# Patient Record
Sex: MALE | Race: OTHER | NOT HISPANIC OR LATINO
[De-identification: names, ages, dates, MRNs, and addresses within clinical notes are randomized per-mention and may not be internally consistent; named-entity substitution may affect disease eponyms.]

---

## 2017-12-26 ENCOUNTER — APPOINTMENT (OUTPATIENT)
Dept: ENDOCRINOLOGY | Facility: CLINIC | Age: 42
End: 2017-12-26
Payer: COMMERCIAL

## 2017-12-26 VITALS
DIASTOLIC BLOOD PRESSURE: 93 MMHG | WEIGHT: 197 LBS | HEIGHT: 68 IN | SYSTOLIC BLOOD PRESSURE: 146 MMHG | HEART RATE: 98 BPM | BODY MASS INDEX: 29.86 KG/M2

## 2017-12-26 DIAGNOSIS — E80.4 GILBERT SYNDROME: ICD-10-CM

## 2017-12-26 DIAGNOSIS — E78.1 PURE HYPERGLYCERIDEMIA: ICD-10-CM

## 2017-12-26 DIAGNOSIS — R46.81 OBSESSIVE-COMPULSIVE BEHAVIOR: ICD-10-CM

## 2017-12-26 PROCEDURE — 36415 COLL VENOUS BLD VENIPUNCTURE: CPT

## 2017-12-26 PROCEDURE — 99205 OFFICE O/P NEW HI 60 MIN: CPT | Mod: 25

## 2017-12-29 LAB
24R-OH-CALCIDIOL SERPL-MCNC: 78.7 PG/ML
25(OH)D3 SERPL-MCNC: 23.5 NG/ML
ALBUMIN SERPL ELPH-MCNC: 4.8 G/DL
ALP BLD-CCNC: 39 U/L
ALT SERPL-CCNC: 23 U/L
ANION GAP SERPL CALC-SCNC: 13 MMOL/L
AST SERPL-CCNC: 30 U/L
BILIRUB SERPL-MCNC: 0.6 MG/DL
BUN SERPL-MCNC: 13 MG/DL
CALCIUM ?TM UR-MCNC: 4 MG/DL
CALCIUM SERPL-MCNC: 10.5 MG/DL
CALCIUM SERPL-MCNC: 10.5 MG/DL
CALCIUM/CREAT UR: 0.1 RATIO
CHLORIDE SERPL-SCNC: 97 MMOL/L
CO2 SERPL-SCNC: 22 MMOL/L
CREAT SERPL-MCNC: 1.24 MG/DL
CREAT SPEC-SCNC: 79 MG/DL
GLUCOSE SERPL-MCNC: 82 MG/DL
PARATHYROID HORMONE INTACT: 31 PG/ML
POTASSIUM SERPL-SCNC: 4.2 MMOL/L
PROT SERPL-MCNC: 7.6 G/DL
SODIUM SERPL-SCNC: 132 MMOL/L
TSH SERPL-ACNC: 1.9 UIU/ML

## 2018-01-09 ENCOUNTER — OTHER (OUTPATIENT)
Age: 43
End: 2018-01-09

## 2018-01-17 ENCOUNTER — RESULT CHARGE (OUTPATIENT)
Age: 43
End: 2018-01-17

## 2018-01-17 ENCOUNTER — OTHER (OUTPATIENT)
Age: 43
End: 2018-01-17

## 2018-02-06 ENCOUNTER — RESULT CHARGE (OUTPATIENT)
Age: 43
End: 2018-02-06

## 2018-02-06 ENCOUNTER — OTHER (OUTPATIENT)
Age: 43
End: 2018-02-06

## 2018-06-19 ENCOUNTER — APPOINTMENT (OUTPATIENT)
Dept: ENDOCRINOLOGY | Facility: CLINIC | Age: 43
End: 2018-06-19
Payer: COMMERCIAL

## 2018-06-19 VITALS
HEIGHT: 68 IN | DIASTOLIC BLOOD PRESSURE: 96 MMHG | HEART RATE: 100 BPM | BODY MASS INDEX: 30.01 KG/M2 | SYSTOLIC BLOOD PRESSURE: 142 MMHG | WEIGHT: 198 LBS

## 2018-06-19 PROCEDURE — 99214 OFFICE O/P EST MOD 30 MIN: CPT

## 2018-06-27 ENCOUNTER — FORM ENCOUNTER (OUTPATIENT)
Age: 43
End: 2018-06-27

## 2018-06-28 ENCOUNTER — APPOINTMENT (OUTPATIENT)
Dept: ULTRASOUND IMAGING | Facility: HOSPITAL | Age: 43
End: 2018-06-28
Payer: COMMERCIAL

## 2018-06-28 ENCOUNTER — OUTPATIENT (OUTPATIENT)
Dept: OUTPATIENT SERVICES | Facility: HOSPITAL | Age: 43
LOS: 1 days | End: 2018-06-28
Payer: COMMERCIAL

## 2018-06-28 PROCEDURE — 76536 US EXAM OF HEAD AND NECK: CPT | Mod: 26

## 2018-06-28 PROCEDURE — 76536 US EXAM OF HEAD AND NECK: CPT

## 2018-07-06 LAB
24R-OH-CALCIDIOL SERPL-MCNC: 40.3 PG/ML
25(OH)D3 SERPL-MCNC: 25.9 NG/ML
ALBUMIN SERPL ELPH-MCNC: 5.3 G/DL
ALP BLD-CCNC: 41 U/L
ALT SERPL-CCNC: 31 U/L
ANION GAP SERPL CALC-SCNC: 15 MMOL/L
AST SERPL-CCNC: 31 U/L
BILIRUB SERPL-MCNC: 0.5 MG/DL
BUN SERPL-MCNC: 11 MG/DL
CALCIUM SERPL-MCNC: 10.3 MG/DL
CALCIUM SERPL-MCNC: 10.3 MG/DL
CHLORIDE SERPL-SCNC: 96 MMOL/L
CO2 SERPL-SCNC: 27 MMOL/L
CREAT SERPL-MCNC: 1.19 MG/DL
GLUCOSE SERPL-MCNC: 93 MG/DL
PARATHYROID HORMONE INTACT: 38 PG/ML
POTASSIUM SERPL-SCNC: 4.7 MMOL/L
PROT SERPL-MCNC: 7.6 G/DL
SODIUM SERPL-SCNC: 138 MMOL/L
TSH SERPL-ACNC: 2.92 UIU/ML

## 2018-07-10 ENCOUNTER — TRANSCRIPTION ENCOUNTER (OUTPATIENT)
Age: 43
End: 2018-07-10

## 2018-07-31 ENCOUNTER — APPOINTMENT (OUTPATIENT)
Dept: OTOLARYNGOLOGY | Facility: CLINIC | Age: 43
End: 2018-07-31
Payer: COMMERCIAL

## 2018-07-31 VITALS
HEART RATE: 95 BPM | TEMPERATURE: 98.2 F | DIASTOLIC BLOOD PRESSURE: 93 MMHG | SYSTOLIC BLOOD PRESSURE: 152 MMHG | RESPIRATION RATE: 16 BRPM | OXYGEN SATURATION: 97 % | WEIGHT: 190 LBS | HEIGHT: 69 IN | BODY MASS INDEX: 28.14 KG/M2

## 2018-07-31 DIAGNOSIS — Z83.49 FAMILY HISTORY OF OTHER ENDOCRINE, NUTRITIONAL AND METABOLIC DISEASES: ICD-10-CM

## 2018-07-31 DIAGNOSIS — Z81.8 FAMILY HISTORY OF OTHER MENTAL AND BEHAVIORAL DISORDERS: ICD-10-CM

## 2018-07-31 DIAGNOSIS — Z82.49 FAMILY HISTORY OF ISCHEMIC HEART DISEASE AND OTHER DISEASES OF THE CIRCULATORY SYSTEM: ICD-10-CM

## 2018-07-31 DIAGNOSIS — Z86.79 PERSONAL HISTORY OF OTHER DISEASES OF THE CIRCULATORY SYSTEM: ICD-10-CM

## 2018-07-31 DIAGNOSIS — Z87.19 PERSONAL HISTORY OF OTHER DISEASES OF THE DIGESTIVE SYSTEM: ICD-10-CM

## 2018-07-31 DIAGNOSIS — J35.2 HYPERTROPHY OF ADENOIDS: ICD-10-CM

## 2018-07-31 DIAGNOSIS — Z83.3 FAMILY HISTORY OF DIABETES MELLITUS: ICD-10-CM

## 2018-07-31 PROCEDURE — 31575 DIAGNOSTIC LARYNGOSCOPY: CPT

## 2018-07-31 PROCEDURE — 99204 OFFICE O/P NEW MOD 45 MIN: CPT | Mod: 25

## 2018-07-31 RX ORDER — SIMVASTATIN 40 MG/1
40 TABLET, FILM COATED ORAL
Refills: 0 | Status: DISCONTINUED | COMMUNITY
End: 2018-07-31

## 2018-07-31 RX ORDER — FENOFIBRATE 145 MG/1
145 TABLET, COATED ORAL
Refills: 0 | Status: ACTIVE | COMMUNITY

## 2018-07-31 RX ORDER — QUETIAPINE 25 MG/1
25 TABLET, FILM COATED ORAL
Refills: 0 | Status: ACTIVE | COMMUNITY

## 2018-07-31 RX ORDER — CLONAZEPAM 0.5 MG/1
0.5 TABLET ORAL
Refills: 0 | Status: ACTIVE | COMMUNITY

## 2018-07-31 RX ORDER — CLOMIPRAMINE HYDROCHLORIDE 25 MG/1
25 CAPSULE ORAL
Refills: 0 | Status: ACTIVE | COMMUNITY

## 2018-11-11 ENCOUNTER — MOBILE ON CALL (OUTPATIENT)
Age: 43
End: 2018-11-11

## 2018-11-29 ENCOUNTER — APPOINTMENT (OUTPATIENT)
Dept: OTOLARYNGOLOGY | Facility: CLINIC | Age: 43
End: 2018-11-29
Payer: COMMERCIAL

## 2018-11-29 VITALS
DIASTOLIC BLOOD PRESSURE: 98 MMHG | HEART RATE: 115 BPM | SYSTOLIC BLOOD PRESSURE: 159 MMHG | RESPIRATION RATE: 17 BRPM | OXYGEN SATURATION: 98 % | TEMPERATURE: 98.3 F

## 2018-11-29 DIAGNOSIS — H61.22 IMPACTED CERUMEN, LEFT EAR: ICD-10-CM

## 2018-11-29 PROCEDURE — 31575 DIAGNOSTIC LARYNGOSCOPY: CPT

## 2018-11-29 PROCEDURE — 99214 OFFICE O/P EST MOD 30 MIN: CPT | Mod: 25

## 2018-11-29 PROCEDURE — 76536 US EXAM OF HEAD AND NECK: CPT

## 2018-12-10 LAB
24R-OH-CALCIDIOL SERPL-MCNC: 66.2 PG/ML
25(OH)D3 SERPL-MCNC: 33.5 NG/ML
ALBUMIN SERPL ELPH-MCNC: 5.4 G/DL
ALP BLD-CCNC: 54 U/L
ALT SERPL-CCNC: 33 U/L
ANION GAP SERPL CALC-SCNC: 13 MMOL/L
AST SERPL-CCNC: 27 U/L
BILIRUB SERPL-MCNC: 0.4 MG/DL
BUN SERPL-MCNC: 12 MG/DL
CA-I SERPL-SCNC: 1.33 MMOL/L
CALCIUM SERPL-MCNC: 10.5 MG/DL
CALCIUM SERPL-MCNC: 10.5 MG/DL
CHLORIDE SERPL-SCNC: 101 MMOL/L
CO2 SERPL-SCNC: 25 MMOL/L
CREAT SERPL-MCNC: 1.32 MG/DL
GLUCOSE SERPL-MCNC: 93 MG/DL
PARATHYROID HORMONE INTACT: 33 PG/ML
POTASSIUM SERPL-SCNC: 4.3 MMOL/L
PROT SERPL-MCNC: 7.6 G/DL
SODIUM SERPL-SCNC: 139 MMOL/L

## 2018-12-14 ENCOUNTER — MOBILE ON CALL (OUTPATIENT)
Age: 43
End: 2018-12-14

## 2018-12-14 ENCOUNTER — OTHER (OUTPATIENT)
Age: 43
End: 2018-12-14

## 2018-12-14 ENCOUNTER — RESULT CHARGE (OUTPATIENT)
Age: 43
End: 2018-12-14

## 2018-12-17 LAB
ALBUMIN SERPL ELPH-MCNC: 5.3 G/DL
ALP BLD-CCNC: 44 U/L
ALT SERPL-CCNC: 33 U/L
ANION GAP SERPL CALC-SCNC: 11 MMOL/L
AST SERPL-CCNC: 27 U/L
BILIRUB SERPL-MCNC: 0.5 MG/DL
BUN SERPL-MCNC: 13 MG/DL
CALCIUM SERPL-MCNC: 9.9 MG/DL
CALCIUM SERPL-MCNC: 9.9 MG/DL
CHLORIDE SERPL-SCNC: 101 MMOL/L
CO2 SERPL-SCNC: 24 MMOL/L
CREAT SERPL-MCNC: 1.2 MG/DL
GLUCOSE SERPL-MCNC: 91 MG/DL
PARATHYROID HORMONE INTACT: 33 PG/ML
POTASSIUM SERPL-SCNC: 4.9 MMOL/L
PROT SERPL-MCNC: 7.3 G/DL
SODIUM SERPL-SCNC: 136 MMOL/L

## 2018-12-18 ENCOUNTER — OTHER (OUTPATIENT)
Age: 43
End: 2018-12-18

## 2018-12-19 ENCOUNTER — APPOINTMENT (OUTPATIENT)
Dept: OTOLARYNGOLOGY | Facility: HOSPITAL | Age: 43
End: 2018-12-19

## 2018-12-19 ENCOUNTER — OTHER (OUTPATIENT)
Age: 43
End: 2018-12-19

## 2018-12-20 ENCOUNTER — RX CHANGE (OUTPATIENT)
Age: 43
End: 2018-12-20

## 2019-01-15 ENCOUNTER — OTHER (OUTPATIENT)
Age: 44
End: 2019-01-15

## 2019-01-25 ENCOUNTER — MOBILE ON CALL (OUTPATIENT)
Age: 44
End: 2019-01-25

## 2019-01-28 ENCOUNTER — APPOINTMENT (OUTPATIENT)
Dept: ENDOCRINOLOGY | Facility: CLINIC | Age: 44
End: 2019-01-28

## 2019-02-01 ENCOUNTER — APPOINTMENT (OUTPATIENT)
Dept: OTOLARYNGOLOGY | Facility: HOSPITAL | Age: 44
End: 2019-02-01

## 2019-02-08 PROBLEM — N18.9 CHRONIC KIDNEY DISEASE, UNSPECIFIED: Chronic | Status: ACTIVE | Noted: 2018-12-18

## 2019-02-08 PROBLEM — E21.3 HYPERPARATHYROIDISM, UNSPECIFIED: Chronic | Status: ACTIVE | Noted: 2018-12-18

## 2019-02-08 PROBLEM — I34.1 NONRHEUMATIC MITRAL (VALVE) PROLAPSE: Chronic | Status: ACTIVE | Noted: 2018-12-18

## 2019-02-08 PROBLEM — E78.5 HYPERLIPIDEMIA, UNSPECIFIED: Chronic | Status: ACTIVE | Noted: 2018-12-18

## 2019-03-04 ENCOUNTER — TRANSCRIPTION ENCOUNTER (OUTPATIENT)
Age: 44
End: 2019-03-04

## 2019-05-09 ENCOUNTER — APPOINTMENT (OUTPATIENT)
Dept: OTOLARYNGOLOGY | Facility: CLINIC | Age: 44
End: 2019-05-09

## 2019-07-29 ENCOUNTER — FORM ENCOUNTER (OUTPATIENT)
Age: 44
End: 2019-07-29

## 2019-07-30 ENCOUNTER — OUTPATIENT (OUTPATIENT)
Dept: OUTPATIENT SERVICES | Facility: HOSPITAL | Age: 44
LOS: 1 days | End: 2019-07-30
Payer: COMMERCIAL

## 2019-07-30 DIAGNOSIS — Z90.49 ACQUIRED ABSENCE OF OTHER SPECIFIED PARTS OF DIGESTIVE TRACT: Chronic | ICD-10-CM

## 2019-07-30 DIAGNOSIS — Z90.89 ACQUIRED ABSENCE OF OTHER ORGANS: Chronic | ICD-10-CM

## 2019-07-30 PROCEDURE — A9500: CPT

## 2019-07-30 PROCEDURE — 78072 PARATHYRD PLANAR W/SPECT&CT: CPT

## 2019-07-30 PROCEDURE — A9512: CPT

## 2019-07-30 PROCEDURE — 78072 PARATHYRD PLANAR W/SPECT&CT: CPT | Mod: 26

## 2019-08-27 ENCOUNTER — APPOINTMENT (OUTPATIENT)
Dept: OTOLARYNGOLOGY | Facility: CLINIC | Age: 44
End: 2019-08-27
Payer: COMMERCIAL

## 2019-08-27 VITALS
OXYGEN SATURATION: 96 % | SYSTOLIC BLOOD PRESSURE: 168 MMHG | TEMPERATURE: 98.3 F | DIASTOLIC BLOOD PRESSURE: 114 MMHG | HEART RATE: 100 BPM

## 2019-08-27 PROCEDURE — 99214 OFFICE O/P EST MOD 30 MIN: CPT | Mod: 25

## 2019-08-27 PROCEDURE — 76536 US EXAM OF HEAD AND NECK: CPT

## 2019-08-27 NOTE — PROCEDURE
[FreeTextEntry3] : \par [facility]\par THYROID/NECK ULTRASOUND REPORT\par \par NAME: DESIRAE CATHERINE            MR# [id number] 	              : 71	         DATE: 19\par \par HISTORY/ INDICATIONS: A 44-year-old male with primary hyperparathyroidism rule out a parathyroid adenoma or thyroid disease.  \par \par COMPARISON: Prior study 18.\par \par PROCEDURE: Physician performed high-resolution ultrasound gray scale imaging and color Doppler supplementation of the thyroid gland and neck was obtained in the longitudinal and transverse planes using a 13 MHz linear transducer with image capture.  All measurements are in centimeters (longitudinal x AP x transverse).  \par \par FINDINGS: Overall the thyroid gland is normal in size, hypoechoic and  heterogeneous in echotexture without discrete nodules. Vascularity is minimal on color Doppler flow.  Size measurement deferred as this is a parathyroid study.  An ovoid, hypoechoic structure just inferior to the left thyroid lobe with a feeding vessel is consistent with a parathyroid adenoma.\par \par PARATHYROID GLANDS: An ovoid smoothly marginated structure adjacent to the common carotid artery and  from the left lower thyroid lobe parenchyma by an echogenic line measures 0.98 x 0.57 x 1.02 cm with an identified feeding vessel on Doppler study. There are no other identified enlarged parathyroid glands in the central neck compartment. \par \par LYMPH NODES: There are several benign appearing subcentimeter lymph nodes identified at neck levels III bilaterally, all with echogenic hilar lines and a short long axis ratio < 0.5 in the transverse plane.\par \par IMPRESSION: A 44-year-old male with borderline biochemical evidence of primary hyperparathyroidism and imaging evidence supporting a left inferior positioned parathyroid adenoma. The thyroid gland echo texture is suggestive of autoimmune thyroiditis. There were no enlarged or morphologically abnormal appearing cervical lymph nodes identified. \par \par RECOMMENDATIONS: A minimally invasive parathyroidectomy should be considered if after further monitoring his serum calcium rises and his iPTH remains minimally or non- suppressed above 25 pg/ml.  \par \par Electronically signed by Viktor Engle MD on 19,  2:23 PM [de-identified] : \par

## 2019-08-27 NOTE — REASON FOR VISIT
[FreeTextEntry1] : aMrquise Alcantar MD PCP, Anders Dela Cruz MD Cardiologist,  Dorian Wills MD  Endocrinologist [FreeTextEntry2] : surgical consultation for primary hyperparathyroidism and follow up after 4-D neck CT scan for parathyroid localization and repeat labs.

## 2019-08-27 NOTE — CONSULT LETTER
[Dear  ___] : Dear  [unfilled], [Consult Letter:] : I had the pleasure of evaluating your patient, [unfilled]. [Please see my note below.] : Please see my note below. [Consult Closing:] : Thank you very much for allowing me to participate in the care of this patient.  If you have any questions, please do not hesitate to contact me. [DrMalia  ___] : Dr. ESTRELLA [Sincerely,] : Sincerely, [FreeTextEntry3] : \par Viktor Engle M.D., FACS, ECNU\par Director Center for Thyroid & Parathyroid Surgery\par [facility]\par Certified in Thyroid/Parathyroid/Neck Ultrasound, ECNU/ AIUM\par \par , Department of Otolaryngology\par [facility] [employer] at [facility]\par

## 2019-08-27 NOTE — HISTORY OF PRESENT ILLNESS
[de-identified] : Yifan is a generally healthy 43-year-old male  in the [employer] industry who was first noted 4-5 years ago to have mild hypercalcemia and had been monitored for this.  His calcium level most recently has been in the high normal range with a normal but nonsuppressed iPTH and a low vitamin D 25-OH total (25.9).  He has normal renal function.  He has no history of nephrolithiasis. He is euthyroid and a recent thyroid ultrasound revealed bilateral subcentimeter colloid cysts and two potential candidates for enlarged parathyroid glands that were described as extra-thyroidal adjacent to the posterior/inferior thyroid lobes measuring 9 mm (RT) and 10 mm (LT).  There were no abnormal cervical lymph nodes identified.  Both of his parents have or had hypothyroidism.  There is no family history of thyroid cancer or hypercalcemia.  He has no known radiation exposures. He denies bone fractures, joint or bone pain, memory loss, depression, fatigue, muscle weakness, abdominal pain, nausea, constipation, but does have polyuria and polydipsia. Yifan denies recent shortness of breath, voice changes, dysphagia, anterior neck pain, neck pressure or mass. There is no family history of thyroid cancer. He denies any known radiation exposures in his youth. \par   [FreeTextEntry1] : Yifan returns to review recent blood results, 4-D neck CT and ultrasound reports to consider a MIP. There had been a question in the past of primary versus secondary hyperparathyroidism.  In 2016 he had both elevation of his iPTH as well as mild hypercalcemia (61.7 pg/ml/ 10.3 mg/dl) and hypercalciuria, confirming the diagnosis of primary hyperparathyroidism. His calcium and iPTH levels have fluctuated since then but he has always had a non-suppressed PTH in association with an elevated serum calcium level.  His last calcium was higher at 10.5 mg/dl with normal renal function and normal Vitamin D 25-OH total.  A 4-D CT scan of the neck confirmed a left inferior parathyroid adenoma with typical contrast enhancement and washout characteristics measuring 10 mm.  Although he appears to be asymptomatic, based on his age under 50 years, he meets NIH consensus criteria for a MIP.  There were no significant thyroid nodules.  He had repeat labs sent in December of last year and both calcium and iPTH had normalized so surgery was postponed. His last set of labs in July revealed a Ca/PTH of 10.1/41.6 pg/ml.  His vitamin D 25-OH total has now come up to 44.2. Other than chronic fatigue he is not very symptomatic.  He is to have a polysomnogram shortly and if he does not have TYLER then his fatigue could be related to primary hyperparathyroidism.

## 2019-10-01 ENCOUNTER — APPOINTMENT (OUTPATIENT)
Dept: ENDOCRINOLOGY | Facility: CLINIC | Age: 44
End: 2019-10-01
Payer: COMMERCIAL

## 2019-10-01 VITALS
BODY MASS INDEX: 29.77 KG/M2 | HEIGHT: 69 IN | WEIGHT: 201 LBS | DIASTOLIC BLOOD PRESSURE: 101 MMHG | SYSTOLIC BLOOD PRESSURE: 139 MMHG | HEART RATE: 86 BPM

## 2019-10-01 PROCEDURE — 99214 OFFICE O/P EST MOD 30 MIN: CPT

## 2019-10-01 NOTE — PHYSICAL EXAM
[Alert] : alert [Normal Sclera/Conjunctiva] : normal sclera/conjunctiva [Thyroid Not Enlarged] : the thyroid was not enlarged [Normal Outer Ear/Nose] : the ears and nose were normal in appearance [No Respiratory Distress] : no respiratory distress [Clear to Auscultation] : lungs were clear to auscultation bilaterally [Normal S1, S2] : normal S1 and S2 [Normal Rate] : heart rate was normal  [Pedal Pulses Normal] : the pedal pulses are present [Normal Bowel Sounds] : normal bowel sounds [Spine Straight] : spine straight [Normal Gait] : normal gait [No Rash] : no rash [Normal Reflexes] : deep tendon reflexes were 2+ and symmetric [Oriented x3] : oriented to person, place, and time

## 2019-10-03 NOTE — ASSESSMENT
[FreeTextEntry1] : 43 y/o M pt with:\par 1. Hx of primary hyperparathyroidism, with high normal levels of Ca and iPTH, and normal Vit D levels:\par An US confirmed a L inferior 1cm parathyroid adenoma, that concords with imaging studies. \par Pt has history of OCD, HTN, and obesity. He was recently seen by ENT, and is awaiting for sleep apnea disorder studies. \par Pt will return in 12/2019 for f/u. At present time, currently leaning towards surgical excision of parathyroid adenoma. \par \par Return in 1/2020.

## 2019-10-03 NOTE — END OF VISIT
[FreeTextEntry3] : All medical record entries made by the Scribe were at my, Dr. Dorian Wills, direction and personally dictated by me on 10/01/2019. I have reviewed the chart and agree that the record accurately reflects my personal performance of the history, physical exam, assessment and plan. I have also personally directed, reviewed and agreed with the chart.  [>50% of Time Spent on Counseling for ____] : Greater than 50% of the encounter time was spent on counseling for [unfilled] [Time Spent: ___ minutes] : I have spent [unfilled] minutes of face to face time with the patient

## 2019-10-03 NOTE — HISTORY OF PRESENT ILLNESS
[FreeTextEntry1] : 4/18/17 calcium 10.8, intact PTH 46, a 25 oh vit d 32.9\par 8/7/17, calcium 10.6, s. cret 1.4, i PTH 57.7 (15-65), a 25 oh vit d : 36.3,tsh 1.7\par 12/26/17, Ca 10.5, i PTH 31, TSH 1.9\par 1/2/18 Thyroid ultrasound, heterogeneous gland. Extrathyroidal nodule 0.8 x 0.6 x 0.1 cm posterior to the left thyroid lobe Consistent with parathyroid nodule/adenoma\par 7/30/19 Radionuclide parathyroid scan and thyroid scan: with approximately 20 mCi of technetium 99m labeled sestamibi. Impression: No uptake within a soft tissue nodule posterior to the left thyroid lobe which may correspond to one of the abnormality seen on referenced ultrasound. There is posterior extension of the right thyroid lobe which demonstrates similar uptake to the remainder of the thyroid gland. There is no definite parathyroid adenoma visualized.\par 8/19/19 iPTH 41.6, TSH 2.54, Vit D 25 OH 44.2, Ca 10.1, s. creat 1.3, cholesterol 185, LDL-c 115.4, , A1c 5.8% \par \par 43 y/o M pt with Hx of mild hypercalcemia (dx at [facility]), hyperparathyroidism, and parathyroid adenoma.\par Other PMHx: VIt D deficiency, reflux laryngitis, hypercholesterolemia, OCD\par No PMHx of kidney stones, bone fractures, weight loss, malabsorption, bone pain, endocrinopathies\par No FHx of calcium disorders including FHH.\par \par 10/1/19\par Today pt presents for endocrine f/u. He notes his BP has been elevated and "may be on medications down the road." Pt states he did a sleep test and will bring in the results; he notes he will have parathyroid and ca levels in 12/2019 and then will decide on parathyroid surgery.\par Overall, pt gained 3lbs since 6/2018.\par \par Current Medicines: Klonopin, Seroquel, Zocor, fenofibrate, clomipramine, Lipitor, Vit D 2000

## 2019-12-12 ENCOUNTER — APPOINTMENT (OUTPATIENT)
Dept: OTOLARYNGOLOGY | Facility: CLINIC | Age: 44
End: 2019-12-12
Payer: COMMERCIAL

## 2019-12-12 VITALS
TEMPERATURE: 98.1 F | OXYGEN SATURATION: 98 % | RESPIRATION RATE: 15 BRPM | HEART RATE: 91 BPM | SYSTOLIC BLOOD PRESSURE: 117 MMHG | DIASTOLIC BLOOD PRESSURE: 76 MMHG

## 2019-12-12 PROCEDURE — 31575 DIAGNOSTIC LARYNGOSCOPY: CPT

## 2019-12-12 PROCEDURE — 99214 OFFICE O/P EST MOD 30 MIN: CPT | Mod: 25

## 2019-12-12 NOTE — HISTORY OF PRESENT ILLNESS
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  His calcium level most recently has been in the high normal range with a normal but nonsuppressed iPTH and a low vitamin D 25-OH total (25.9).  He has normal renal function.  He has no history of nephrolithiasis. He is euthyroid and a recent thyroid ultrasound revealed bilateral subcentimeter colloid cysts and two potential candidates for enlarged parathyroid glands that were described as extra-thyroidal adjacent to the posterior/inferior thyroid lobes measuring 9 mm (RT) and 10 mm (LT).  There were no abnormal cervical lymph nodes identified.  Both of his parents have or had hypothyroidism.  There is no family history of thyroid cancer or hypercalcemia.  He has no known radiation exposures. He denies bone fractures, joint or bone pain, memory loss, depression, fatigue, muscle weakness, abdominal pain, nausea, constipation, but does have polyuria and polydipsia. Yifan denies recent shortness of breath, voice changes, dysphagia, anterior neck pain, neck pressure or mass. There is no family history of thyroid cancer. He denies any known radiation exposures in his youth. \par   [FreeTextEntry1] : Yifan returns to review recent blood results, 4-D neck CT and ultrasound reports to consider a MIP. There had been a question in the past of primary versus secondary hyperparathyroidism.  In 2016 he had both elevation of his iPTH as well as mild hypercalcemia (61.7 pg/ml/ 10.3 mg/dl) and hypercalciuria, initially confirming the diagnosis of primary hyperparathyroidism. His calcium and iPTH levels have fluctuated since then but he has always had a non-suppressed PTH in association with an elevated serum calcium level. However, his last PTH was low at 17 pg/ml.  A 4-D CT scan of the neck confirmed a left inferior parathyroid adenoma with typical contrast enhancement and washout characteristics measuring 10 mm.  Although he appears to be asymptomatic, based on his age under 50 years, he meets NIH consensus criteria for a MIP.  There were no significant thyroid nodules.  He had repeat labs sent in December of last year and both calcium and iPTH had normalized so surgery was postponed. His last set of labs in July revealed a Ca/PTH of 10.1/41.6 pg/ml.   His last calcium was higher at 10.8 mg/dl with normal renal function and normal Vitamin D 25-OH total.  Other than chronic fatigue he is not very symptomatic.  He had a polysomnogram and moderate TYLER on a home study. His his fatigue could still be related to primary hyperparathyroidism.

## 2019-12-12 NOTE — DATA REVIEWED
[de-identified] : see HPI [de-identified] : see HPI [de-identified] : see HPI [de-identified] : see HPI [de-identified] : Sestamibi scan reviewed

## 2019-12-12 NOTE — CONSULT LETTER
[Dear  ___] : Dear  [unfilled], [Consult Letter:] : I had the pleasure of evaluating your patient, [unfilled]. [Please see my note below.] : Please see my note below. [Consult Closing:] : Thank you very much for allowing me to participate in the care of this patient.  If you have any questions, please do not hesitate to contact me. [Sincerely,] : Sincerely, [FreeTextEntry3] : \par Viktor Engle M.D., FACS, ECNU\par Director Center for Thyroid & Parathyroid Surgery\par [facility]\par Certified in Thyroid/Parathyroid/Neck Ultrasound, ECNU/ AIUM\par \par , Department of Otolaryngology\par [facility] [employer] at [facility]\par

## 2019-12-12 NOTE — REASON FOR VISIT
[FreeTextEntry2] : surgical consultation for primary hyperparathyroidism and follow up after 4-D neck CT scan and Sestamibi for parathyroid localization and repeat labs. [FreeTextEntry1] : Marquise Alcantar MD PCP, Anders Dela Cruz MD Cardiologist,  Dorian Wills MD  Endocrinologist

## 2019-12-14 LAB
ALBUMIN MFR SERPL ELPH: 67.6 %
ALBUMIN SERPL ELPH-MCNC: 5.4 G/DL
ALBUMIN SERPL-MCNC: 5.3 G/DL
ALBUMIN/GLOB SERPL: 2 RATIO
ALP BLD-CCNC: 39 U/L
ALPHA1 GLOB MFR SERPL ELPH: 2.9 %
ALPHA1 GLOB SERPL ELPH-MCNC: 0.2 G/DL
ALPHA2 GLOB MFR SERPL ELPH: 7.2 %
ALPHA2 GLOB SERPL ELPH-MCNC: 0.6 G/DL
ALT SERPL-CCNC: 26 U/L
ANION GAP SERPL CALC-SCNC: 13 MMOL/L
AST SERPL-CCNC: 25 U/L
B-GLOBULIN MFR SERPL ELPH: 11.9 %
B-GLOBULIN SERPL ELPH-MCNC: 0.9 G/DL
BILIRUB SERPL-MCNC: 0.6 MG/DL
BUN SERPL-MCNC: 16 MG/DL
CA-I SERPL-SCNC: 1.33 MMOL/L
CALCIUM SERPL-MCNC: 10.9 MG/DL
CALCIUM SERPL-MCNC: 10.9 MG/DL
CHLORIDE SERPL-SCNC: 97 MMOL/L
CO2 SERPL-SCNC: 27 MMOL/L
CREAT SERPL-MCNC: 1.31 MG/DL
GAMMA GLOB FLD ELPH-MCNC: 0.8 G/DL
GAMMA GLOB MFR SERPL ELPH: 10.4 %
GLUCOSE SERPL-MCNC: 91 MG/DL
INTERPRETATION SERPL IEP-IMP: NORMAL
PARATHYROID HORMONE INTACT: 24 PG/ML
POTASSIUM SERPL-SCNC: 4.5 MMOL/L
PROT SERPL-MCNC: 7.9 G/DL
SODIUM SERPL-SCNC: 137 MMOL/L

## 2019-12-17 LAB
ACE BLD-CCNC: 45 U/L
PTH RELATED PROT SERPL-MCNC: <2 PMOL/L

## 2019-12-23 LAB
MISCELLANEOUS TEST: NORMAL
PROC NAME: NORMAL

## 2020-02-25 ENCOUNTER — APPOINTMENT (OUTPATIENT)
Dept: ENDOCRINOLOGY | Facility: CLINIC | Age: 45
End: 2020-02-25
Payer: COMMERCIAL

## 2020-02-25 VITALS
BODY MASS INDEX: 30.86 KG/M2 | SYSTOLIC BLOOD PRESSURE: 100 MMHG | HEART RATE: 90 BPM | DIASTOLIC BLOOD PRESSURE: 66 MMHG | WEIGHT: 209 LBS

## 2020-02-25 PROCEDURE — 99214 OFFICE O/P EST MOD 30 MIN: CPT

## 2020-02-25 NOTE — PHYSICAL EXAM
[Alert] : alert [Normal Sclera/Conjunctiva] : normal sclera/conjunctiva [Thyroid Not Enlarged] : the thyroid was not enlarged [No Respiratory Distress] : no respiratory distress [Normal Outer Ear/Nose] : the ears and nose were normal in appearance [Clear to Auscultation] : lungs were clear to auscultation bilaterally [Normal Rate] : heart rate was normal  [Pedal Pulses Normal] : the pedal pulses are present [Normal S1, S2] : normal S1 and S2 [Normal Gait] : normal gait [Spine Straight] : spine straight [Normal Bowel Sounds] : normal bowel sounds [No Rash] : no rash [Normal Reflexes] : deep tendon reflexes were 2+ and symmetric [Oriented x3] : oriented to person, place, and time

## 2020-02-26 NOTE — END OF VISIT
[>50% of Time Spent on Counseling for ____] : Greater than 50% of the encounter time was spent on counseling for [unfilled] [FreeTextEntry3] : All medical record entries made by the Scribe were at my, Dr. Dorian Wills, direction and personally dictated by me on 02/25/2020. I have reviewed the chart and agree that the record accurately reflects my personal performance of the history, physical exam, assessment and plan. I have also personally directed, reviewed and agreed with the chart.  [Time Spent: ___ minutes] : I have spent [unfilled] minutes of face to face time with the patient

## 2020-02-26 NOTE — REVIEW OF SYSTEMS
[As Noted in HPI] : as noted in HPI [Recent Weight Gain (___ Lbs)] : recent [unfilled] ~Ulb weight gain [Negative] : Endocrine [Abdominal Pain] : no abdominal pain

## 2020-02-26 NOTE — HISTORY OF PRESENT ILLNESS
[FreeTextEntry1] : 4/18/17 calcium 10.8, intact PTH 46, a 25 oh vit d 32.9\par 8/7/17, calcium 10.6, s. cret 1.4, i PTH 57.7 (15-65), a 25 oh vit d : 36.3,tsh 1.7\par 12/26/17, Ca 10.5, i PTH 31, TSH 1.9\par 1/2/18 Thyroid US: heterogeneous gland. Extrathyroidal nodule 0.8 x 0.6 x 0.1 cm posterior to the left thyroid lobe Consistent with parathyroid nodule/adenoma\par 7/30/19 Radionuclide parathyroid scan and thyroid scan: with approximately 20 mCi of technetium 99m labeled sestamibi. Impression: No uptake within a soft tissue nodule posterior to the left thyroid lobe which may correspond to one of the abnormality seen on referenced ultrasound. There is posterior extension of the right thyroid lobe which demonstrates similar uptake to the remainder of the thyroid gland. There is no definite parathyroid adenoma visualized.\par 8/19/19 iPTH 41.6, TSH 2.54, Vit D 25 OH 44.2, Ca 10.1, s. creat 1.3, cholesterol 185, LDL-c 115.4, , A1c 5.8% \par 11/26/19 Ca 10.8, iPTH 17, Vit D 25-OH 32, Vit D 1-25- OH 78, \par 12/12/19 Ca ionized 1.33, Ca 10.9, iPTH 24, s. creat 1.31\par \par 43 y/o M pt with Hx of mild hypercalcemia (dx at [facility]), hyperparathyroidism, and parathyroid adenoma.\par Other PMHx: VIt D deficiency, reflux laryngitis, hypercholesterolemia, OCD, sleep apnea (on C-PAP, as per pt)\par No PMHx of kidney stones, bone fractures, weight loss, malabsorption, bone pain, endocrinopathies\par No FHx of calcium disorders including FHH.\par \par 10/1/19\par Today pt presents for endocrine f/u. He notes his BP has been elevated and "may be on medications down the road." Pt states he did a sleep test and will bring in the results; he notes he will have parathyroid and ca levels in 12/2019 and then will decide on parathyroid surgery.\par Overall, pt gained 3lbs since 6/2018.\par \par 2/25/2020\par Today pt presents with his mother for endocrine f/u, feeling well. Pt notes he was recently diagnosed with sleep apnea and is using C-PAP. He states is taking Olmesartan, Fenofibrate, and Lipitor.  \par Pt gained 8lbs since 10/2019.\par Denies abdominal discomfort\par \par Current Medicines: Klonopin, Seroquel, Zocor, Fenofibrate, Olmesartan, clomipramine, Lipitor, Vit D 2000

## 2020-02-26 NOTE — ASSESSMENT
[FreeTextEntry1] : 45 y/o M pt with:\par 1. Hx of primary hyperparathyroidism, with fluctuating levels of  Ca from normal to mild elevation\par Pt does not hx of MEN nor FHx of hypercalcemia. Pt has remained asymptomatic, however, he has unspecific symptoms of fatigue and tiredness. A neck CAT scan done in 11/2018 shows an impression of L inferior parathyroid adenoma which is ~220mg and it lies along the posterior margin of the lower pole. In addition, recent labs show Ca 10.9 with iPTH 24 from 12/12/19. Pt's levels of iPTH and Ca has been fluctuating, and he can continue with present approach as long he  is committed to long years of monitoring for hypercalcemia and probable complications. Otherwise surgery is a suitable option. \par Continue with Vit D and Ca supplementations.\par \par Return in 1-2 yrs.

## 2020-02-26 NOTE — ADDENDUM
[FreeTextEntry1] : I, Stevenson Sánchez, acted solely as a scribe for Dr. Dorian Wills on this date. 02/25/2020.

## 2020-03-05 ENCOUNTER — APPOINTMENT (OUTPATIENT)
Dept: OTOLARYNGOLOGY | Facility: CLINIC | Age: 45
End: 2020-03-05
Payer: COMMERCIAL

## 2020-03-05 VITALS
OXYGEN SATURATION: 99 % | SYSTOLIC BLOOD PRESSURE: 111 MMHG | RESPIRATION RATE: 17 BRPM | HEART RATE: 86 BPM | DIASTOLIC BLOOD PRESSURE: 72 MMHG | TEMPERATURE: 97.6 F

## 2020-03-05 PROCEDURE — 99214 OFFICE O/P EST MOD 30 MIN: CPT | Mod: 25

## 2020-03-05 PROCEDURE — 31575 DIAGNOSTIC LARYNGOSCOPY: CPT

## 2020-03-11 LAB
24R-OH-CALCIDIOL SERPL-MCNC: 80.7 PG/ML
25(OH)D3 SERPL-MCNC: 37.7 NG/ML
ACE BLD-CCNC: 45 U/L
ALBUMIN SERPL ELPH-MCNC: 5.5 G/DL
ALP BLD-CCNC: 37 U/L
ALT SERPL-CCNC: 28 U/L
ANION GAP SERPL CALC-SCNC: 14 MMOL/L
AST SERPL-CCNC: 26 U/L
BILIRUB SERPL-MCNC: 0.5 MG/DL
BUN SERPL-MCNC: 12 MG/DL
CA-I SERPL-SCNC: 1.34 MMOL/L
CALCIUM SERPL-MCNC: 11 MG/DL
CALCIUM SERPL-MCNC: 11 MG/DL
CHLORIDE SERPL-SCNC: 100 MMOL/L
CO2 SERPL-SCNC: 25 MMOL/L
CREAT SERPL-MCNC: 1.35 MG/DL
GLUCOSE SERPL-MCNC: 89 MG/DL
PARATHYROID HORMONE INTACT: 22 PG/ML
POTASSIUM SERPL-SCNC: 5 MMOL/L
PROT SERPL-MCNC: 7.7 G/DL
SODIUM SERPL-SCNC: 138 MMOL/L
THYROGLOB AB SERPL-ACNC: 23.5 IU/ML
THYROPEROXIDASE AB SERPL IA-ACNC: <10 IU/ML
TSH SERPL-ACNC: 2.8 UIU/ML

## 2020-07-07 NOTE — PROCEDURE
[Image(s) Captured] : image(s) captured and filed [Unable to Cooperate with Mirror] : patient unable to cooperate with mirror [Gag Reflex] : gag reflex preventing mirror examination [Topical Lidocaine] : topical lidocaine [Flexible Endoscope] : examined with the flexible endoscope [Oxymetazoline HCl] : oxymetazoline HCl [Serial Number: ___] : Serial Number: [unfilled] [de-identified] : sleep apnea now on CPAP, c/o increasing eructations and GERD.  Voice intermittently hoarse [de-identified] : The nasal septum is minimally deviated to the right with dry crusting bilaterally.  There are no masses or polyps and the nasal mucosa is normal. The nasal secretions are thick but not purulent.  The choanae and posterior nasopharynx are normal without masses or drainage. The Eustachian tube orifices appear patent. The pharynx, including the posterior and lateral pharyngeal walls, the vallecula and base of tongue are normal without ulcerations, lesions or masses. The hypopharynx including the pyriform sinuses open well without pooling of secretions, mucosal lesions or masses. The supraglottic larynx including the epiglottis, petiole, glossoepiglottic folds and pharyngoepiglottic folds are normal without mucosal lesions, ulcerations or masses. The glottis reveals normal false vocal folds. The true vocal folds are glistening white, tense and of equal length, without paralysis, having symmetric mobility on adduction and abduction. There are no mucosal lesions, nodules, cysts, erythroplasia or leukoplakia. The posterior cricoid area has healthy pink mucosa in the interarytenoid area and esophageal inlet. There is moderate thickening/edema of the interarytenoid mucosa, tiger stripping and pachydermia suggestive of posterior laryngitis from laryngopharyngeal acid reflux disease. The trachea is clear without narrowing in the immediate subglottic region, without deviation or lesions. \par

## 2020-07-07 NOTE — REASON FOR VISIT
[FreeTextEntry1] : Marquise Alcantar MD PCP, Anders Dela Cruz MD Cardiologist,  Dorian Wills MD  Endocrinologist [FreeTextEntry2] : surgical consultation for primary hyperparathyroidism and follow up after 4-D neck CT scan and Sestamibi for parathyroid localization and repeat labs.

## 2020-07-07 NOTE — CONSULT LETTER
[Dear  ___] : Dear  [unfilled], [Consult Letter:] : I had the pleasure of evaluating your patient, [unfilled]. [Please see my note below.] : Please see my note below. [Consult Closing:] : Thank you very much for allowing me to participate in the care of this patient.  If you have any questions, please do not hesitate to contact me. [Sincerely,] : Sincerely, [FreeTextEntry3] : \par Viktor Engle M.D., FACS, ECNU\par Director Center for Thyroid & Parathyroid Surgery\par [facility]\par Certified in Thyroid/Parathyroid/Neck Ultrasound, ECNU/ AIUM\par \par , Department of Otolaryngology\par [facility] at [facility]\par

## 2020-07-07 NOTE — DATA REVIEWED
[de-identified] : see HPI [de-identified] : see HPI [de-identified] : see HPI [de-identified] : see HPI [de-identified] : Sestamibi scan reviewed

## 2020-07-07 NOTE — HISTORY OF PRESENT ILLNESS
[FreeTextEntry1] : Yifan returns to review recent blood results, 4-D neck CT and ultrasound reports to consider a MIP. There had been a question in the past of primary versus secondary hyperparathyroidism.  In 2016 he had both elevation of his iPTH as well as mild hypercalcemia (61.7 pg/ml/ 10.3 mg/dl) and hypercalciuria, initially confirming the diagnosis of primary hyperparathyroidism. His calcium and iPTH levels have fluctuated since then but he has always had a non-suppressed PTH in association with an elevated serum calcium level. However, his last PTH was low at 17 pg/ml.  A 4-D CT scan of the neck confirmed a left inferior parathyroid adenoma with typical contrast enhancement and washout characteristics measuring 10 mm.  Although he appears to be asymptomatic, based on his age under 50 years, he meets NIH consensus criteria for a MIP.  There were no significant thyroid nodules.  He had repeat labs sent in December of last year and both calcium and iPTH had normalized so surgery was postponed. His last set of labs in July revealed a Ca/PTH of 10.1/41.6 pg/ml.   His last calcium was higher at 10.8 mg/dl with normal renal function and normal Vitamin D 25-OH total.  Other than chronic fatigue he is not very symptomatic.  He had a polysomnogram and moderate TYLER on a home study. His fatigue is improved on CPAP. He has a humidifier in the machine but still has dry nasal crusts. He has chronic GERD and more eructations with the CPAP.  Voice is intermittently hoarse.  He denies PND, cough, fever, chills or sweats.  He may have autoimmune thyroiditis based on neck CT findings. His most recent labs on 01/06/2020: calcium 10.1 mg/dl, creatinine 1.2, GFR > 60ml/min, no Vit D or PTH sent. [de-identified] : Yifan is a generally healthy 43-year-old male  in the [employer] industry who was first noted 4-5 years ago to have mild hypercalcemia and had been monitored for this.  His calcium level most recently has been in the high normal range with a normal but nonsuppressed iPTH and a low vitamin D 25-OH total (25.9).  He has normal renal function.  He has no history of nephrolithiasis. He is euthyroid and a recent thyroid ultrasound revealed bilateral subcentimeter colloid cysts and two potential candidates for enlarged parathyroid glands that were described as extra-thyroidal adjacent to the posterior/inferior thyroid lobes measuring 9 mm (RT) and 10 mm (LT).  There were no abnormal cervical lymph nodes identified.  Both of his parents have or had hypothyroidism.  There is no family history of thyroid cancer or hypercalcemia.  He has no known radiation exposures. He denies bone fractures, joint or bone pain, memory loss, depression, fatigue, muscle weakness, abdominal pain, nausea, constipation, but does have polyuria and polydipsia. Yifan denies recent shortness of breath, voice changes, dysphagia, anterior neck pain, neck pressure or mass. There is no family history of thyroid cancer. He denies any known radiation exposures in his youth. \par

## 2020-08-24 ENCOUNTER — APPOINTMENT (OUTPATIENT)
Dept: OTOLARYNGOLOGY | Facility: CLINIC | Age: 45
End: 2020-08-24
Payer: COMMERCIAL

## 2020-08-24 VITALS
WEIGHT: 202 LBS | OXYGEN SATURATION: 98 % | HEART RATE: 105 BPM | TEMPERATURE: 98.2 F | BODY MASS INDEX: 29.83 KG/M2 | DIASTOLIC BLOOD PRESSURE: 75 MMHG | SYSTOLIC BLOOD PRESSURE: 117 MMHG

## 2020-08-24 PROCEDURE — 31575 DIAGNOSTIC LARYNGOSCOPY: CPT

## 2020-08-24 PROCEDURE — 76536 US EXAM OF HEAD AND NECK: CPT

## 2020-08-24 PROCEDURE — 99214 OFFICE O/P EST MOD 30 MIN: CPT | Mod: 25

## 2020-08-24 RX ORDER — CHLORHEXIDINE GLUCONATE, 0.12% ORAL RINSE 1.2 MG/ML
0.12 SOLUTION DENTAL
Qty: 473 | Refills: 0 | Status: ACTIVE | COMMUNITY
Start: 2020-01-20

## 2020-08-24 RX ORDER — TOBRAMYCIN AND DEXAMETHASONE 3; 1 MG/ML; MG/ML
0.3-0.1 SUSPENSION/ DROPS OPHTHALMIC
Qty: 5 | Refills: 0 | Status: ACTIVE | COMMUNITY
Start: 2020-05-26

## 2020-08-24 NOTE — PROCEDURE
[Image(s) Captured] : image(s) captured and filed [Gag Reflex] : gag reflex preventing mirror examination [Unable to Cooperate with Mirror] : patient unable to cooperate with mirror [Oxymetazoline HCl] : oxymetazoline HCl [Flexible Endoscope] : examined with the flexible endoscope [Topical Lidocaine] : topical lidocaine [Serial Number: ___] : Serial Number: [unfilled] [FreeTextEntry3] : \par [facility]\par THYROID/NECK ULTRASOUND REPORT\par \par NAME: DESIRAE CATHERINE            MR# [id number] 	              : 71	         DATE: 2020\par \par HISTORY/ INDICATIONS: A 45-year-old male with primary hyperparathyroidism rule out a parathyroid adenoma or thyroid disease.  \par \par COMPARISON: Prior study 18\par \par PROCEDURE: Physician performed high-resolution ultrasound gray scale imaging and color Doppler supplementation of the parathyroid glands was obtained in the longitudinal and transverse planes using a 13 MHz linear transducer with image capture.  All measurements are in centimeters (longitudinal x AP x transverse).  \par \par FINDINGS: Overall the thyroid gland is normal in size, hypoechoic and  heterogeneous in echotexture without discrete nodules. Vascularity is minimal on color Doppler flow.  Size measurement deferred as this is a parathyroid study.  An ovoid, hypoechoic structure just inferior to the left thyroid lobe with a feeding vessel is consistent with a parathyroid adenoma.\par \par PARATHYROID GLANDS: An ovoid smoothly marginated structure adjacent to the common carotid artery and  from the left lower thyroid lobe parenchyma by an echogenic line measures 0.87 x 0.71 x 0.47 cm with an identified feeding vessel on Doppler study. There are no other identified enlarged parathyroid glands in the central neck compartment. \par \par LYMPH NODES: There are several benign appearing subcentimeter lymph nodes identified at neck levels III bilaterally, all with echogenic hilar lines and a short long axis ratio < 0.5 in the transverse plane.\par \par IMPRESSION: A 45-year-old male with borderline biochemical evidence of primary hyperparathyroidism and imaging evidence supporting a left inferior positioned parathyroid adenoma that remains stable since 2018.  The thyroid gland echo texture is suggestive of autoimmune thyroiditis. There were no enlarged or morphologically abnormal appearing cervical lymph nodes identified. \par \par RECOMMENDATIONS: A minimally invasive parathyroidectomy should be considered if after further monitoring his serum calcium rises and his iPTH remains minimally or non- suppressed above 25 pg/ml.  \par \par Electronically signed by Viktor Engle MD on 2020,  1:10 PM [de-identified] : The nasal septum is minimally deviated to the right with dry crusting bilaterally.  There are no masses or polyps and the nasal mucosa is normal. The nasal secretions are thick but not purulent.  The choanae and posterior nasopharynx are normal without masses or drainage. The Eustachian tube orifices appear patent. The pharynx, including the posterior and lateral pharyngeal walls, the vallecula and base of tongue are normal without ulcerations, lesions or masses. The hypopharynx including the pyriform sinuses open well without pooling of secretions, mucosal lesions or masses. The supraglottic larynx including the epiglottis, petiole, glossoepiglottic folds and pharyngoepiglottic folds are normal without mucosal lesions, ulcerations or masses. The glottis reveals normal false vocal folds. The true vocal folds are glistening white, tense and of equal length, without paralysis, having symmetric mobility on adduction and abduction. There are no mucosal lesions, nodules, cysts, erythroplasia or leukoplakia. The posterior cricoid area has healthy pink mucosa in the interarytenoid area and esophageal inlet. There is mild to moderate thickening/edema of the interarytenoid mucosa, tiger stripping and pachydermia suggestive of posterior laryngitis from laryngopharyngeal acid reflux disease. The trachea is clear without narrowing in the immediate subglottic region, without deviation or lesions. \par  [de-identified] : sleep apnea now on CPAP, c/o increasing eructations and GERD.  Voice intermittently hoarse

## 2020-08-24 NOTE — HISTORY OF PRESENT ILLNESS
[FreeTextEntry1] : Yifan returns to review recent blood results, 4-D neck CT and ultrasound reports to again consider a MIP. There had been a question in the past of primary versus secondary hyperparathyroidism.  In 2016 he had both elevation of his iPTH as well as mild hypercalcemia (61.7 pg/ml/ 10.3 mg/dl) and hypercalciuria, initially confirming the diagnosis of primary hyperparathyroidism. His calcium and iPTH levels have fluctuated since then but he has had a non-suppressed PTH in association with an elevated serum calcium level. However, his last PTH was low at 19 pg/ml with a normal ionized calcium of 5.6 (ULN) and serum calcium of 10.9 but elevate Albumin at 5.3 mg/dl this month.   A 4-D CT scan of the neck confirmed a left inferior parathyroid adenoma with typical contrast enhancement and washout characteristics measuring 10 mm.  Although he appears to be asymptomatic, based on his age under 50 years, he meets NIH consensus criteria for a MIP.  There were no significant thyroid nodules.  He may have autoimmune thyroiditis based on neck CT findings.  He denies fever, body aches, cough, cyanosis, chest burning, anosmia or recent known COVID exposures.  All family members at home are well.  [de-identified] : Yifan is a generally healthy 43-year-old male  in the [employer] industry who was first noted 4-5 years ago to have mild hypercalcemia and had been monitored for this.  His calcium level most recently has been in the high normal range with a normal but nonsuppressed iPTH and a low vitamin D 25-OH total (25.9).  He has normal renal function.  He has no history of nephrolithiasis. He is euthyroid and a recent thyroid ultrasound revealed bilateral subcentimeter colloid cysts and two potential candidates for enlarged parathyroid glands that were described as extra-thyroidal adjacent to the posterior/inferior thyroid lobes measuring 9 mm (RT) and 10 mm (LT).  There were no abnormal cervical lymph nodes identified.  Both of his parents have or had hypothyroidism.  There is no family history of thyroid cancer or hypercalcemia.  He has no known radiation exposures. He denies bone fractures, joint or bone pain, memory loss, depression, fatigue, muscle weakness, abdominal pain, nausea, constipation, but does have polyuria and polydipsia. Yifan denies recent shortness of breath, voice changes, dysphagia, anterior neck pain, neck pressure or mass. There is no family history of thyroid cancer. He denies any known radiation exposures in his youth. \par

## 2020-08-24 NOTE — DATA REVIEWED
[de-identified] : see HPI [de-identified] : see HPI [de-identified] : see HPI [de-identified] : see HPI [de-identified] : Sestamibi scan reviewed

## 2020-08-24 NOTE — REASON FOR VISIT
[FreeTextEntry2] : follow up surgical consultation for primary hyperparathyroidism and follow up after 4-D neck CT scan and Sestamibi for parathyroid localization and repeat labs. [FreeTextEntry1] : Marquise Alcantar MD PCP, Anders Dela Cruz MD Cardiologist,  Dorian Wills MD  Endocrinologist

## 2020-08-24 NOTE — CONSULT LETTER
[Consult Letter:] : I had the pleasure of evaluating your patient, [unfilled]. [Please see my note below.] : Please see my note below. [Dear  ___] : Dear  [unfilled], [Consult Closing:] : Thank you very much for allowing me to participate in the care of this patient.  If you have any questions, please do not hesitate to contact me. [Sincerely,] : Sincerely, [FreeTextEntry3] : \par Viktor Engle M.D., FACS, ECNU\par Director [facility] Thyroid [facility] Parathyroid Surgery\par [facility] [place] [facility]\par Certified in Thyroid/Parathyroid/Neck Ultrasound, ECNU/ AIUM\par \par , Department of Otolaryngology\par [facility] [employer] at [facility]\par

## 2020-10-16 ENCOUNTER — APPOINTMENT (OUTPATIENT)
Dept: ENDOCRINOLOGY | Facility: CLINIC | Age: 45
End: 2020-10-16
Payer: COMMERCIAL

## 2020-10-16 VITALS
DIASTOLIC BLOOD PRESSURE: 71 MMHG | HEART RATE: 99 BPM | BODY MASS INDEX: 30.42 KG/M2 | SYSTOLIC BLOOD PRESSURE: 125 MMHG | WEIGHT: 206 LBS

## 2020-10-16 PROCEDURE — 99215 OFFICE O/P EST HI 40 MIN: CPT

## 2020-10-16 RX ORDER — ADHESIVE TAPE 3"X 2.3 YD
50 MCG TAPE, NON-MEDICATED TOPICAL
Refills: 0 | Status: ACTIVE | COMMUNITY

## 2020-10-20 ENCOUNTER — OUTPATIENT (OUTPATIENT)
Dept: OUTPATIENT SERVICES | Facility: HOSPITAL | Age: 45
LOS: 1 days | End: 2020-10-20

## 2020-10-20 ENCOUNTER — APPOINTMENT (OUTPATIENT)
Dept: RADIOLOGY | Facility: CLINIC | Age: 45
End: 2020-10-20
Payer: COMMERCIAL

## 2020-10-20 ENCOUNTER — APPOINTMENT (OUTPATIENT)
Dept: ULTRASOUND IMAGING | Facility: CLINIC | Age: 45
End: 2020-10-20
Payer: COMMERCIAL

## 2020-10-20 DIAGNOSIS — Z90.89 ACQUIRED ABSENCE OF OTHER ORGANS: Chronic | ICD-10-CM

## 2020-10-20 DIAGNOSIS — Z90.49 ACQUIRED ABSENCE OF OTHER SPECIFIED PARTS OF DIGESTIVE TRACT: Chronic | ICD-10-CM

## 2020-10-20 PROCEDURE — 76770 US EXAM ABDO BACK WALL COMP: CPT | Mod: 26

## 2020-10-20 PROCEDURE — 77085 DXA BONE DENSITY AXL VRT FX: CPT | Mod: 26

## 2020-11-05 ENCOUNTER — APPOINTMENT (OUTPATIENT)
Dept: UROLOGY | Facility: CLINIC | Age: 45
End: 2020-11-05
Payer: COMMERCIAL

## 2020-11-05 VITALS — HEART RATE: 91 BPM | DIASTOLIC BLOOD PRESSURE: 68 MMHG | SYSTOLIC BLOOD PRESSURE: 108 MMHG | TEMPERATURE: 97.9 F

## 2020-11-05 PROCEDURE — 99072 ADDL SUPL MATRL&STAF TM PHE: CPT

## 2020-11-05 PROCEDURE — 99204 OFFICE O/P NEW MOD 45 MIN: CPT

## 2020-11-05 NOTE — HISTORY OF PRESENT ILLNESS
[FreeTextEntry1] : 45M with hyperparathyroidism. underwent screening renal bladder ultrasound. no stones noted. +calcification within prostate. 61 gram prostate nocturia x 1. good FOS. no urgency. no hematuria. no dysruia. no nausea vomting. no fevers chills. no flank pain. no family history prostate kidney bladder cancer.

## 2020-11-05 NOTE — ASSESSMENT
[FreeTextEntry1] : BPH\par prostatic calcifications are likely postinflammatory\par UA UCX PSA\par f/u annually

## 2020-11-06 LAB
APPEARANCE: CLEAR
BACTERIA: NEGATIVE
BILIRUBIN URINE: NEGATIVE
BLOOD URINE: NEGATIVE
COLOR: NORMAL
GLUCOSE QUALITATIVE U: NEGATIVE
HYALINE CASTS: 0 /LPF
KETONES URINE: NEGATIVE
LEUKOCYTE ESTERASE URINE: NEGATIVE
MICROSCOPIC-UA: NORMAL
NITRITE URINE: NEGATIVE
PH URINE: 6
PROTEIN URINE: NEGATIVE
PSA SERPL-MCNC: 0.24 NG/ML
RED BLOOD CELLS URINE: 1 /HPF
SPECIFIC GRAVITY URINE: 1.01
SQUAMOUS EPITHELIAL CELLS: 1 /HPF
UROBILINOGEN URINE: NORMAL
WHITE BLOOD CELLS URINE: 3 /HPF

## 2020-11-09 LAB — BACTERIA UR CULT: NORMAL

## 2020-12-28 NOTE — ASSESSMENT
[FreeTextEntry1] : Primary hyperparathyroidism. He was first noted to have mild hypercalcemia in 2014 per his report. He has hypercalcemia with elevated or inappropriately normal PTH concentrations (over 20 pg/mL) consistent with primary hyperparathyroidism. Evaluation for other causes of hypercalcemia previously unremarkable; we can repeat now although likely low yield. There is no indication to correct serum calcium for high albumin. We discussed the complications of primary hyperparathyroidism, including but not limited to hypercalcemia, nephrolithiasis, and osteoporosis. We discussed renal ultrasound and bone density testing for further evaluation. We discussed my recommendation for parathyroid surgery due to age. We discussed the recommendations for calcium and vitamin D intake; there is no indication to restrict calcium intake in patients with primary hyperparathyroidism. We discussed referral for genetic testing due to young age at the time of diagnosis.\par Laboratory evaluation as below\par Referral for genetic testing\par Renal ultrasound to evaluate for nephrolithiasis\par Bone density testing with vertebral fracture assessment to evaluate for osteoporosis and morphometric vertebral fracture\par Calcium 1000 mg daily from diet and supplements (to be taken in divided doses as no more than 500-600 mg can be absorbed at one time); advised dietary calcium\par Continue current vitamin D regimen \par Recommend proceeding with parathyroid surgery\par \par CC:\par Dr. Marquise Alcantar, Fax [phone]

## 2020-12-28 NOTE — HISTORY OF PRESENT ILLNESS
[FreeTextEntry1] : Mr. White is a 45 year-old man with a history of primary hyperparathyroidism presenting for a second opinion regarding his parathyroid disease. He is referred by Dr. Viktor Engle.\par \par Primary hyperparathyroidism. \par He was first diagnosed with hypercalcemia in 2014 per his report. Serum calcium has been within 1 mg/dL above the upper normal limit with inappropriately normal PTH concentrations; serum calcium was at the upper limit of normal for some period of time. PTH values have trended down and were as low as 19 pg/mL, but others all over 20 pg/mL. Most recently, laboratories significant for: calcium 11.2 mg/dL (albumin 5.7 g/dL; normal: 8.4-10.2), PTH 30.8 pg/mL (normal: 15-65), 25-hydroxyvitamin D 50.5 ng/mL, BUN/creatinine 16/1.2 mg/dL (eGFR >60 mL/min). \par Neck ultrasound and computed tomography demonstrate a left inferior 1.0 cm parathyroid adenoma. \par No history of nephrolithiasis. No history of renal imaging. 24 hour urine calcium excretion 273 mg in October 2016; urine calcium to creatinine ratio around 0.013. \par No history of fracture. No recent bone density testing; no previous report available. \par He was having a cup of milk daily; not in the last month. He is taking vitamin D 2000 intl units daily. No calcium supplement or multivitamin.\par No family history of calcium disorders or endocrine tumors. Mother and father with history of hypothyroidism.

## 2020-12-28 NOTE — PHYSICAL EXAM
[Alert] : alert [Healthy Appearance] : healthy appearance [Normal Sclera/Conjunctiva] : normal sclera/conjunctiva [No Acute Distress] : no acute distress [No Neck Mass] : no neck mass was observed [No LAD] : no lymphadenopathy [Supple] : the neck was supple [Thyroid Not Enlarged] : the thyroid was not enlarged [Normal Gait] : normal gait [No Stigmata of Cushings Syndrome] : no stigmata of Cushings Syndrome [Normal Insight/Judgement] : insight and judgment were intact [Kyphosis] : no kyphosis present [Acanthosis Nigricans] : no acanthosis nigricans [de-identified] : no moon facies, no supraclavicular fat pads

## 2020-12-28 NOTE — DATA REVIEWED
[FreeTextEntry1] : Summarized as per HPI; reviewed laboratory results from September 2016 to present and imaging from January 2018 to present.

## 2021-02-04 ENCOUNTER — NON-APPOINTMENT (OUTPATIENT)
Age: 46
End: 2021-02-04

## 2021-03-18 ENCOUNTER — APPOINTMENT (OUTPATIENT)
Dept: OTOLARYNGOLOGY | Facility: CLINIC | Age: 46
End: 2021-03-18
Payer: COMMERCIAL

## 2021-03-18 VITALS
RESPIRATION RATE: 14 BRPM | TEMPERATURE: 97.8 F | HEIGHT: 68 IN | WEIGHT: 206 LBS | DIASTOLIC BLOOD PRESSURE: 69 MMHG | OXYGEN SATURATION: 98 % | BODY MASS INDEX: 31.22 KG/M2 | SYSTOLIC BLOOD PRESSURE: 109 MMHG | HEART RATE: 105 BPM

## 2021-03-18 DIAGNOSIS — Z86.39 PERSONAL HISTORY OF OTHER ENDOCRINE, NUTRITIONAL AND METABOLIC DISEASE: ICD-10-CM

## 2021-03-18 PROCEDURE — 76536 US EXAM OF HEAD AND NECK: CPT

## 2021-03-18 PROCEDURE — 99215 OFFICE O/P EST HI 40 MIN: CPT | Mod: 25

## 2021-03-18 PROCEDURE — 99072 ADDL SUPL MATRL&STAF TM PHE: CPT

## 2021-03-18 PROCEDURE — 31575 DIAGNOSTIC LARYNGOSCOPY: CPT

## 2021-03-18 NOTE — REASON FOR VISIT
[FreeTextEntry2] : follow up surgical consultation for primary hyperparathyroidism and follow up after 4-D neck CT scan, Sestamibi scan for parathyroid localization, DEXA scan, Endocrine consult and repeat labs. [FreeTextEntry1] : Marquise Alcantar MD PCP, Anders Dela Cruz MD Cardiologist,  Dorian Wills MD  Endocrinologist

## 2021-03-18 NOTE — PROCEDURE
[Image(s) Captured] : image(s) captured and filed [Unable to Cooperate with Mirror] : patient unable to cooperate with mirror [Gag Reflex] : gag reflex preventing mirror examination [Topical Lidocaine] : topical lidocaine [Oxymetazoline HCl] : oxymetazoline HCl [Flexible Endoscope] : examined with the flexible endoscope [Serial Number: ___] : Serial Number: [unfilled] [FreeTextEntry3] : \par [facility]\par THYROID/NECK ULTRASOUND REPORT\par \par NAME: DESIRAE ACTHERINE            MR# [id number] 	              : 71	         DATE: 2021\par \par HISTORY/ INDICATIONS: A 45-year-old male with primary hyperparathyroidism rule out a parathyroid adenoma or thyroid disease.  \par \par COMPARISON: Prior study 2020\par \par PROCEDURE: Physician performed high-resolution ultrasound gray scale imaging and color Doppler supplementation of the parathyroid glands was obtained in the longitudinal and transverse planes using a 13 MHz linear transducer with image capture.  All measurements are in centimeters (longitudinal x AP x transverse).  \par \par FINDINGS: Overall the thyroid gland is normal in size, hypoechoic and  heterogeneous in echotexture without discrete nodules. Vascularity is minimal on color Doppler flow.  Size measurement deferred as this is a parathyroid study.  An ovoid, smoothly marginated, hypoechoic structure just inferior to the left mid-lower thyroid lobe with a feeding vessel is consistent with a parathyroid adenoma.\par \par PARATHYROID GLANDS: An ovoid smoothly marginated hypoechoic structure posterior to the left mid-lower thyroid lobe  by an echogenic line measures 0.89 x 0.46 x 1.23 cm (previously 0.87 x 0.71 x 0.47 cm) with an identified feeding vessel on Doppler study. There are no other identified enlarged parathyroid glands in the central neck compartment. \par \par LYMPH NODES: There are several benign appearing subcentimeter lymph nodes identified at neck levels III bilaterally, all with echogenic hilar lines and a short long axis ratio < 0.5 in the transverse plane.\par \par IMPRESSION: A 45-year-old male with borderline biochemical evidence of primary hyperparathyroidism and imaging evidence supporting a left inferior positioned parathyroid adenoma that is slightly larger since 2018.  The thyroid gland echo texture is suggestive of autoimmune thyroiditis. There were no enlarged or morphologically abnormal appearing cervical lymph nodes identified. \par \par RECOMMENDATIONS: A minimally invasive parathyroidectomy should be considered if after further monitoring his serum calcium rises and his iPTH remains minimally or non- suppressed above 25 pg/ml.  \par \par Electronically signed by Viktor Engle MD on 2021,  4:25 PM [de-identified] : The nasal septum is deviated to the right with dry crusting bilaterally.  There are no masses or polyps and the nasal mucosa is normal. The nasal secretions are thick but not purulent.  The choanae and posterior nasopharynx are normal without masses or drainage. The Eustachian tube orifices appear patent. The pharynx, including the posterior and lateral pharyngeal walls, the vallecula and base of tongue are normal without ulcerations, lesions or masses. The hypopharynx including the pyriform sinuses open well without pooling of secretions, mucosal lesions or masses. The supraglottic larynx including the epiglottis, petiole, glossoepiglottic folds and pharyngoepiglottic folds are normal without mucosal lesions, ulcerations or masses. The posterior airway space is narrowed consistent with sleep apnea. The glottis reveals normal false vocal folds. The true vocal folds are glistening white, tense and of equal length, without paralysis, having symmetric mobility on adduction and abduction. There are no mucosal lesions, nodules, cysts, erythroplasia or leukoplakia. The posterior cricoid area has healthy pink mucosa in the interarytenoid area and esophageal inlet. There is mild to moderate thickening/edema of the interarytenoid mucosa, tiger stripping and pachydermia suggestive of posterior laryngitis from laryngopharyngeal acid reflux disease. The trachea is clear without narrowing in the immediate subglottic region, without deviation or lesions. \par  [de-identified] : sleep apnea now on CPAP, c/o increasing eructations and GERD.  Voice intermittently hoarse

## 2021-03-18 NOTE — DATA REVIEWED
[de-identified] : see HPI [de-identified] : see HPI [de-identified] : see HPI: 4-D CT reviewed again with Viktor Brooke (radiologist) [de-identified] : see HPI [de-identified] : Sestamibi scan reviewed

## 2021-03-18 NOTE — CONSULT LETTER
[Dear  ___] : Dear  [unfilled], [Consult Letter:] : I had the pleasure of evaluating your patient, [unfilled]. [Please see my note below.] : Please see my note below. [Consult Closing:] : Thank you very much for allowing me to participate in the care of this patient.  If you have any questions, please do not hesitate to contact me. [Sincerely,] : Sincerely, [DrMalia  ___] : Dr. ESTRELLA [FreeTextEntry3] : \par Viktor Engle M.D., FACS, ECNU\par Director Center for Thyroid & Parathyroid Surgery\par [facility] [place] [facility]\par Certified in Thyroid/Parathyroid/Neck Ultrasound, ECNU/ AIUM\par \par , Department of Otolaryngology\par [facility] [employer] at [facility]\par

## 2021-03-18 NOTE — HISTORY OF PRESENT ILLNESS
[de-identified] : Yifan is a generally healthy 43-year-old male  in the [employer] who was first noted 4-5 years ago to have mild hypercalcemia and had been monitored for this.  His calcium level most recently has been in the high normal range with a normal but nonsuppressed iPTH and a low vitamin D 25-OH total (25.9).  He has normal renal function.  He has no history of nephrolithiasis. He is euthyroid and a recent thyroid ultrasound revealed bilateral subcentimeter colloid cysts and two potential candidates for enlarged parathyroid glands that were described as extra-thyroidal adjacent to the posterior/inferior thyroid lobes measuring 9 mm (RT) and 10 mm (LT).  There were no abnormal cervical lymph nodes identified.  Both of his parents have or had hypothyroidism.  There is no family history of thyroid cancer or hypercalcemia.  He has no known radiation exposures. He denies bone fractures, joint or bone pain, memory loss, depression, fatigue, muscle weakness, abdominal pain, nausea, constipation, but does have polyuria and polydipsia. Yifan denies recent shortness of breath, voice changes, dysphagia, anterior neck pain, neck pressure or mass. There is no family history of thyroid cancer. He denies any known radiation exposures in his youth. \par   [FreeTextEntry1] : Yifan returns to review recent blood results, bone density, a 4-D neck CT and ultrasound reports after Endocrine consultation to again consider a MIP. There had been a question in the past of primary versus secondary hyperparathyroidism.  In 2014 he had both elevation of his iPTH as well as mild hypercalcemia (61.7 pg/ml/ 10.3 mg/dl) and hypercalciuria, initially confirming the diagnosis of primary hyperparathyroidism. His calcium and iPTH levels have fluctuated since then but he has had a non-suppressed PTH in association with an elevated serum calcium level. However, his last PTH was low at 19 pg/ml with a normal ionized calcium of 5.6 (ULN) and serum calcium of 10.9 but elevated Albumin at 5.3 mg/dl.   He has always had a normal but nonsuppressed PTH when the serum calcium was only slightly increased.  He may have autoimmune thyroiditis based on neck CT findings. He has no renal stones on renal US and normal GFR.  He has seen a urologist for f/u.  A bone density study showed osteopenia in the left radius (-1.4).A 4-D CT scan of the neck confirmed a 1 cm left inferior parathyroid adenoma with typical contrast enhancement and washout characteristics.  I again reviewed the neck CT from 2018 with Viktor Brooke and he feels strongly that the enhancement characteristics are highly supportive for a left inferior parathyroid adenoma.  Early this month his calcium was 10.3 with a nonsuppressed PTH of 30.5 consistent with PHPT.  Although he appears to be asymptomatic, based on his age under 50 years and also osteopenia in his forearm, he meets NIH consensus criteria for a MIP.  There were no significant thyroid nodules or other enlarged parathyroid glands on imaging. Genetic w/u negative for MEN.  He denies fever, body aches, cough, cyanosis, chest burning, anosmia or recent known COVID exposures.  All family members at home are well.

## 2021-05-14 ENCOUNTER — APPOINTMENT (OUTPATIENT)
Dept: ENDOCRINOLOGY | Facility: CLINIC | Age: 46
End: 2021-05-14
Payer: COMMERCIAL

## 2021-05-14 VITALS
BODY MASS INDEX: 32.23 KG/M2 | DIASTOLIC BLOOD PRESSURE: 69 MMHG | WEIGHT: 212 LBS | HEART RATE: 101 BPM | SYSTOLIC BLOOD PRESSURE: 120 MMHG

## 2021-05-14 PROCEDURE — 99072 ADDL SUPL MATRL&STAF TM PHE: CPT

## 2021-05-14 PROCEDURE — 99214 OFFICE O/P EST MOD 30 MIN: CPT

## 2021-05-14 RX ORDER — ATORVASTATIN CALCIUM 80 MG/1
80 TABLET, FILM COATED ORAL
Refills: 0 | Status: ACTIVE | COMMUNITY
Start: 2020-01-22

## 2021-05-17 LAB
24R-OH-CALCIDIOL SERPL-MCNC: 46.6 PG/ML
25(OH)D3 SERPL-MCNC: 29.4 NG/ML
ALBUMIN SERPL ELPH-MCNC: 5.1 G/DL
ALP BLD-CCNC: 36 U/L
ALT SERPL-CCNC: 36 U/L
ANION GAP SERPL CALC-SCNC: 11 MMOL/L
AST SERPL-CCNC: 30 U/L
BILIRUB SERPL-MCNC: 0.6 MG/DL
BUN SERPL-MCNC: 13 MG/DL
CA-I SERPL-SCNC: 1.31 MMOL/L
CALCIUM SERPL-MCNC: 10 MG/DL
CALCIUM SERPL-MCNC: 10 MG/DL
CHLORIDE SERPL-SCNC: 99 MMOL/L
CO2 SERPL-SCNC: 23 MMOL/L
CREAT SERPL-MCNC: 1.34 MG/DL
GLUCOSE SERPL-MCNC: 120 MG/DL
MAGNESIUM SERPL-MCNC: 1.7 MG/DL
PARATHYROID HORMONE INTACT: 21 PG/ML
PHOSPHATE SERPL-MCNC: 2.1 MG/DL
POTASSIUM SERPL-SCNC: 4.5 MMOL/L
PROT SERPL-MCNC: 7.1 G/DL
SODIUM SERPL-SCNC: 133 MMOL/L

## 2021-05-18 ENCOUNTER — NON-APPOINTMENT (OUTPATIENT)
Age: 46
End: 2021-05-18

## 2021-05-19 LAB
ALBUMIN MFR SERPL ELPH: 65.7 %
ALBUMIN SERPL-MCNC: 4.7 G/DL
ALBUMIN/GLOB SERPL: 2 RATIO
ALBUPE: 27.9 %
ALPHA1 GLOB MFR SERPL ELPH: 3.6 %
ALPHA1 GLOB SERPL ELPH-MCNC: 0.3 G/DL
ALPHA1UPE: 33 %
ALPHA2 GLOB MFR SERPL ELPH: 8.2 %
ALPHA2 GLOB SERPL ELPH-MCNC: 0.6 G/DL
ALPHA2UPE: 14.6 %
B-GLOBULIN MFR SERPL ELPH: 12.2 %
B-GLOBULIN SERPL ELPH-MCNC: 0.9 G/DL
BETAUPE: 11.4 %
GAMMA GLOB FLD ELPH-MCNC: 0.7 G/DL
GAMMA GLOB MFR SERPL ELPH: 10.3 %
GAMMAUPE: 13.1 %
IGA 24H UR QL IFE: NORMAL
INTERPRETATION SERPL IEP-IMP: NORMAL
KAPPA LC 24H UR QL: NORMAL
PROT PATTERN 24H UR ELPH-IMP: NORMAL
PROT SERPL-MCNC: 7.1 G/DL
PROT SERPL-MCNC: 7.1 G/DL
PROT UR-MCNC: 7 MG/DL
PROT UR-MCNC: 7 MG/DL

## 2021-05-24 ENCOUNTER — TRANSCRIPTION ENCOUNTER (OUTPATIENT)
Age: 46
End: 2021-05-24

## 2021-05-24 LAB — PTH RELATED PROT SERPL-MCNC: <2 PMOL/L

## 2021-05-24 NOTE — ASSESSMENT
[FreeTextEntry1] : Primary hyperparathyroidism. He was first noted to have mild hypercalcemia in 2014 per his report. He has hypercalcemia with elevated or inappropriately normal PTH concentrations (over 20 pg/mL) consistent with primary hyperparathyroidism. His last PTH was relatively suppressed. Evaluation for other causes of hypercalcemia previously unremarkable; we can repeat now although likely low yield. There is no indication to correct serum calcium for high albumin. We discussed the complications of primary hyperparathyroidism, including but not limited to hypercalcemia, nephrolithiasis, and osteoporosis. We discussed my recommendation for parathyroid surgery due to age. We discussed the recommendations for calcium and vitamin D intake; there is no indication to restrict calcium intake in patients with primary hyperparathyroidism. \par Laboratory evaluation as below\par Calcium 1000 mg daily from diet and supplements (to be taken in divided doses as no more than 500-600 mg can be absorbed at one time); advised dietary calcium\par Continue current vitamin D regimen pending level\par Recommend proceeding with parathyroid surgery\par \par CC:\par Dr. Marquise Alcantar, Fax [phone]

## 2021-05-24 NOTE — ADDENDUM
[FreeTextEntry1] : Recent laboratory results as below. Ionized calcium borderline elevated, although total calcium within range. PTH inappropriately normal for elevated ionized calcium, consistent with primary hyperparathyroidism. Serum sodium borderline low and will inquire regarding hydration status. Phosphorus borderline low, which can be consistent with primary hyperparathyroidism; recommend monitoring. Vitamin D around goal and recommend continuing current regimen. Evaluation for other causes of hypercalcemia unremarkable. I left a telephone message for Mr. White and will send a Portal message. 5/24/21

## 2021-05-24 NOTE — PHYSICAL EXAM
[Alert] : alert [Healthy Appearance] : healthy appearance [No Acute Distress] : no acute distress [Normal Sclera/Conjunctiva] : normal sclera/conjunctiva [No Neck Mass] : no neck mass was observed [No LAD] : no lymphadenopathy [Supple] : the neck was supple [Thyroid Not Enlarged] : the thyroid was not enlarged [No Stigmata of Cushings Syndrome] : no stigmata of Cushings Syndrome [Normal Gait] : normal gait [Normal Insight/Judgement] : insight and judgment were intact [Kyphosis] : no kyphosis present [Acanthosis Nigricans] : no acanthosis nigricans [de-identified] : no moon facies, no supraclavicular fat pads

## 2021-05-24 NOTE — DATA REVIEWED
[FreeTextEntry1] : Laboratories (March 29, 2021) reviewed and significant for: \par Calcium 11.0 mg/dL (normal: 8.6-10.3; albumin 5.3 g/dL)\par Ionized calcium 5.9 mg/dL (normal: 4.80-5.60)\par PTH 15 pg/mL (normal: 14-64)\par 25-hydroxyvitamin D 24 ng/mL\par BUN/creatinine 16/1.22 mg/dL (eGFR 70 mL/min)\par Phosphorus 3.4 mg/dL\par Alkaline phosphatase 40 U/L \par TSH 3.18 uIU/mL\par 24 hour urine calcium 246 mg/total volume 2800 mL\par \par Laboratories (October 21, 2020) reviewed and significant for: \par Calcium 10.4 mg/dL (normal: 8.6-10.3; albumin 5.1 g/dL)\par Ionized calcium 5.4 mg/dL (normal: 4.80-5.60)\par PTH 21 pg/mL (normal: 14-64)\par 25-hydroxyvitamin D 30 ng/mL\par BUN/creatinine 16/1.22 mg/dL (eGFR 70 mL/min)\par Phosphorus 2.9 mg/dL\par Magnesium 2.0 mg/dL\par Alkaline phosphatase 34 U/L (normal: )\par Serum and urine protein electrophoresis without monoclonal proteins \par \par Summarized as per HPI; reviewed laboratory results from September 2016 to present and imaging from January 2018 to present.

## 2021-05-24 NOTE — HISTORY OF PRESENT ILLNESS
[FreeTextEntry1] : Mr. White is a 45 year-old man with a history of primary hyperparathyroidism presenting for follow-up. I saw him for an initial visit in October 2020.\par \par Primary hyperparathyroidism. \par He was first diagnosed with hypercalcemia in 2014 per his report. Serum calcium has been within 1 mg/dL above the upper normal limit with inappropriately normal PTH concentrations; serum calcium was at the upper limit of normal for some period of time. PTH values have trended down and were as low as 19 pg/mL, but others all over 20 pg/mL. Most recently, laboratories significant for: calcium 11.2 mg/dL (albumin 5.7 g/dL; normal: 8.4-10.2), PTH 30.8 pg/mL (normal: 15-65), 25-hydroxyvitamin D 50.5 ng/mL, BUN/creatinine 16/1.2 mg/dL (eGFR >60 mL/min). \par Neck ultrasound and computed tomography demonstrate a left inferior 1.0 cm parathyroid adenoma. \par No history of nephrolithiasis. Renal ultrasound in October 2020 without nephrolithiasis. 24 hour urine calcium excretion 273 mg in October 2016 and 246 mg in March 2021.\par No history of fracture. Bone density from October 2020 as below, significant for T-scores of +0.1 at the lumbar spine, -1.0 at the femoral neck, +0.2 at the total hip, -1.4 at the distal radius. Vertebral fracture assessment without evidence of compression fractures.\par He was having a cup of milk daily; not recently. He is taking vitamin D 2000 intl units daily. No calcium supplement or multivitamin.\par No family history of calcium disorders or endocrine tumors. Mother and father with history of hypothyroidism. \par Genetic testing from December 2020 negative for pathogenic mutations in the following genes: AP2S1, CASR, CDC73, CDKN1B, GNA11, MEN1, RET.\par \par Interim History \par Laboratory results from October 2020 as below. Serum calcium elevated with inappropriately normal PTH concentration, consistent with primary hyperparathyroidism. Other evaluation for hypercalcemia within range.\par Bone density from October 2020 as below, significant for T-scores of +0.1 at the lumbar spine, -1.0 at the femoral neck, +0.2 at the total hip, -1.4 at the distal radius. Vertebral fracture assessment without evidence of compression fractures. His 10 year fracture risk calculated by FRAX is 2.7% for major osteoporotic fracture and 0.1% for hip fracture, below the treatment thresholds. \par Renal ultrasound from October 2020 without nephrolithiasis but with prostatomegaly and possible parenchymal calcification. \par Genetic testing from December 2020 negative for pathogenic mutations in the following genes: AP2S1, CASR, CDC73, CDKN1B, GNA11, MEN1, RET.\par Recent laboratory results from March 2021 as below. Serum total and ionized calcium elevated with PTH 15 pg/mL (normal: 14-64). \par Medical and surgical history, medications, allergies, social and family history reviewed and updated as needed.

## 2021-05-25 ENCOUNTER — TRANSCRIPTION ENCOUNTER (OUTPATIENT)
Age: 46
End: 2021-05-25

## 2021-06-03 ENCOUNTER — APPOINTMENT (OUTPATIENT)
Dept: OTOLARYNGOLOGY | Facility: CLINIC | Age: 46
End: 2021-06-03
Payer: COMMERCIAL

## 2021-06-03 VITALS
HEIGHT: 69 IN | HEART RATE: 87 BPM | DIASTOLIC BLOOD PRESSURE: 67 MMHG | WEIGHT: 207 LBS | OXYGEN SATURATION: 99 % | BODY MASS INDEX: 30.66 KG/M2 | TEMPERATURE: 98.1 F | SYSTOLIC BLOOD PRESSURE: 101 MMHG

## 2021-06-03 PROCEDURE — 99072 ADDL SUPL MATRL&STAF TM PHE: CPT

## 2021-06-03 PROCEDURE — 31575 DIAGNOSTIC LARYNGOSCOPY: CPT

## 2021-06-03 PROCEDURE — 99214 OFFICE O/P EST MOD 30 MIN: CPT | Mod: 25

## 2021-06-03 NOTE — HISTORY OF PRESENT ILLNESS
[de-identified] : Yifan is a generally healthy 43-year-old male  in the [email or web address] industry who was first noted 4-5 years ago to have mild hypercalcemia and had been monitored for this.  His calcium level most recently has been in the high normal range with a normal but nonsuppressed iPTH and a low vitamin D 25-OH total (25.9).  He has normal renal function.  He has no history of nephrolithiasis. He is euthyroid and a recent thyroid ultrasound revealed bilateral subcentimeter colloid cysts and two potential candidates for enlarged parathyroid glands that were described as extra-thyroidal adjacent to the posterior/inferior thyroid lobes measuring 9 mm (RT) and 10 mm (LT).  There were no abnormal cervical lymph nodes identified.  Both of his parents have or had hypothyroidism.  There is no family history of thyroid cancer or hypercalcemia.  He has no known radiation exposures. He denies bone fractures, joint or bone pain, memory loss, depression, fatigue, muscle weakness, abdominal pain, nausea, constipation, but does have polyuria and polydipsia. Yifan denies recent shortness of breath, voice changes, dysphagia, anterior neck pain, neck pressure or mass. There is no family history of thyroid cancer. He denies any known radiation exposures in his youth. \par   [FreeTextEntry1] : Yifan returns to review blood results, bone density, a 4-D neck CT and ultrasound reports after Endocrine consultation to again consider a MIP. There had been a question in the past of primary versus secondary hyperparathyroidism.  In 2014 he had both elevation of his iPTH as well as mild hypercalcemia (61.7 pg/ml/ 10.3 mg/dl) and hypercalciuria, initially confirming the diagnosis of primary hyperparathyroidism. His calcium and iPTH levels have fluctuated since then but he has had a non-suppressed PTH in association with an elevated serum calcium level. However, his last PTH was low at 19 pg/ml with a normal ionized calcium of 5.6 (ULN) and serum calcium of 10.9 but elevated Albumin at 5.3 mg/dl.   He has always had a normal but nonsuppressed PTH when the serum calcium was only slightly increased.  He may have autoimmune thyroiditis based on neck CT findings. He has no renal stones on renal US and normal GFR.  He has seen a urologist for f/u.  A bone density study showed osteopenia in the left radius (-1.4). A 4-D CT scan of the neck confirmed a 1 cm left inferior parathyroid adenoma with typical contrast enhancement and washout characteristics.  I reviewed the neck CT from 2018 with Viktor Brooke and he feels strongly that the enhancement characteristics are highly supportive for a left inferior parathyroid adenoma.  Early last calcium in May was 10.0 with a nonsuppressed PTH of 21 consistent with PHPT.  Although he appears to be asymptomatic, based on his age under 50 years and also osteopenia in his forearm, he meets NIH consensus criteria for a MIP.  There were no significant thyroid nodules or other enlarged parathyroid glands on imaging. Genetic w/u negative for MEN I or MEN II.  He denies fever, body aches, cough, cyanosis, chest burning, anosmia or recent known COVID exposures.  All family members at home are well. He is now vaccinated. He is using CPAP now x 2 years for OSAS.  He had a bad few days with GERD and had throat irritation and chest discomfort managed with Pepcid AC BID.  His symptoms have improved.

## 2021-06-03 NOTE — DATA REVIEWED
[de-identified] : see HPI [de-identified] : see HPI [de-identified] : see HPI: 4-D CT reviewed again with Viktor Brooke (radiologist) [de-identified] : Sestamibi scan reviewed

## 2021-06-03 NOTE — REASON FOR VISIT
[FreeTextEntry2] : follow up surgical consultation for primary hyperparathyroidism and follow up after 4-D neck CT scan, Sestamibi scan for parathyroid localization, DEXA scan, Endocrine consult and repeat labs. Also sleep apnea.  [FreeTextEntry1] : Marquise Alcantar MD PCP, Anders Dela Cruz MD Cardiologist,  Dorian Wills MD  Endocrinologist

## 2021-06-03 NOTE — PROCEDURE
[Image(s) Captured] : image(s) captured and filed [Unable to Cooperate with Mirror] : patient unable to cooperate with mirror [Gag Reflex] : gag reflex preventing mirror examination [Serial Number: ___] : Serial Number: [unfilled] [de-identified] : The nasal septum is minimally deviated to the right with a spur. There are no masses or polyps and the nasal mucosa and secretions are normal. The choanae and posterior nasopharynx are normal without masses or drainage. The Eustachian tube orifices appear patent. The pharynx, including the posterior and lateral pharyngeal walls, the vallecula and base of tongue are normal without ulcerations, lesions or masses. The hypopharynx including the pyriform sinuses open well without pooling of secretions, mucosal lesions or masses. The supraglottic larynx including the epiglottis, petiole, arytenoids, glossoepiglottic, aryepiglottic and pharyngoepiglottic folds are normal without mucosal lesions, ulcerations or masses. The glottis reveals normal false vocal folds. The true vocal folds are glistening white, tense and of equal length, without paralysis, having symmetric mobility on adduction and abduction. There are no mucosal lesions, nodules, cysts, erythroplasia or leukoplakia. The posterior cricoid area has healthy pink mucosa in the interarytenoid area and esophageal inlet. There is thickening/edema and tiger stripping of the interarytenoid mucosa suggestive of posterior laryngitis from laryngopharyngeal acid reflux disease. The trachea is clear without narrowing in the immediate subglottic region, without deviation or lesions.  [de-identified] : Sleep apnea

## 2021-06-03 NOTE — CONSULT LETTER
[Dear  ___] : Dear  [unfilled], [Consult Letter:] : I had the pleasure of evaluating your patient, [unfilled]. [Please see my note below.] : Please see my note below. [Consult Closing:] : Thank you very much for allowing me to participate in the care of this patient.  If you have any questions, please do not hesitate to contact me. [Sincerely,] : Sincerely, [DrMalia  ___] : Dr. ESTRELLA [FreeTextEntry3] : \par Viktor Engle M.D., FACS, ECNU\par Director [facility] Thyroid [facility] Parathyroid Surgery\par [facility]\par Certified in Thyroid/Parathyroid/Neck Ultrasound, ECNU/ AIUM\par \par , Department of Otolaryngology\par [facility] [employer] [facility] at [facility]\par

## 2021-12-02 ENCOUNTER — APPOINTMENT (OUTPATIENT)
Dept: OTOLARYNGOLOGY | Facility: CLINIC | Age: 46
End: 2021-12-02
Payer: COMMERCIAL

## 2021-12-02 VITALS
OXYGEN SATURATION: 98 % | SYSTOLIC BLOOD PRESSURE: 148 MMHG | DIASTOLIC BLOOD PRESSURE: 93 MMHG | TEMPERATURE: 98 F | HEART RATE: 99 BPM

## 2021-12-02 PROCEDURE — 99214 OFFICE O/P EST MOD 30 MIN: CPT | Mod: 25

## 2021-12-02 PROCEDURE — 31575 DIAGNOSTIC LARYNGOSCOPY: CPT

## 2021-12-02 NOTE — PROCEDURE
[Image(s) Captured] : image(s) captured and filed [Unable to Cooperate with Mirror] : patient unable to cooperate with mirror [Gag Reflex] : gag reflex preventing mirror examination [Serial Number: ___] : Serial Number: [unfilled] [de-identified] : The nasal septum is minimally deviated to the right with a spur. The mucosa is pale and dry from use of CPAP. There are no masses or polyps and the nasal mucosa and secretions are normal. The choanae and posterior nasopharynx are normal without masses or drainage. The Eustachian tube orifices appear patent. The pharynx, including the posterior and lateral pharyngeal walls, the vallecula and base of tongue are normal without ulcerations, lesions or masses. The hypopharynx including the pyriform sinuses open well without pooling of secretions, mucosal lesions or masses. The supraglottic larynx including the epiglottis, petiole, arytenoids, glossoepiglottic, aryepiglottic and pharyngoepiglottic folds are normal without mucosal lesions, ulcerations or masses. The glottis reveals normal false vocal folds.  The true vocal folds are  and hyperemic, thicken but  tense and of equal length, without paralysis, having symmetric mobility on adduction and abduction. There are no mucosal lesions, nodules, cysts, erythroplasia or leukoplakia. The posterior cricoid area has healthy pink mucosa in the interarytenoid area and esophageal inlet. There is thickening/edema and tiger stripping of the interarytenoid mucosa suggestive of posterior laryngitis from laryngopharyngeal acid reflux disease. The trachea is clear without narrowing in the immediate subglottic region, without deviation or lesions.  [de-identified] : Sleep apnea, parathyroid adenoma

## 2021-12-02 NOTE — CONSULT LETTER
[Dear  ___] : Dear  [unfilled], [Consult Letter:] : I had the pleasure of evaluating your patient, [unfilled]. [Please see my note below.] : Please see my note below. [Consult Closing:] : Thank you very much for allowing me to participate in the care of this patient.  If you have any questions, please do not hesitate to contact me. [Sincerely,] : Sincerely, [DrMalia  ___] : Dr. ESTRELLA [FreeTextEntry3] : \par Viktor Engle M.D., FACS, ECNU\par Director Center for Thyroid & Parathyroid Surgery\par [facility]\par Certified in Thyroid/Parathyroid/Neck Ultrasound, ECNU/ AIUM\par \par , Department of Otolaryngology\par [facility] [employer] [facility] at [facility]\par

## 2021-12-02 NOTE — DATA REVIEWED
[de-identified] : see HPI [de-identified] : see HPI [de-identified] : see HPI: 4-D CT reviewed with Viktor Brooke (radiologist)

## 2021-12-02 NOTE — HISTORY OF PRESENT ILLNESS
[de-identified] : Yifan is a generally healthy 43-year-old male  in the [employer] who was first noted 4-5 years ago to have mild hypercalcemia and had been monitored for this.  His calcium level most recently has been in the high normal range with a normal but nonsuppressed iPTH and a low vitamin D 25-OH total (25.9).  He has normal renal function.  He has no history of nephrolithiasis. He is euthyroid and a recent thyroid ultrasound revealed bilateral subcentimeter colloid cysts and two potential candidates for enlarged parathyroid glands that were described as extra-thyroidal adjacent to the posterior/inferior thyroid lobes measuring 9 mm (RT) and 10 mm (LT).  There were no abnormal cervical lymph nodes identified.  Both of his parents have or had hypothyroidism.  There is no family history of thyroid cancer or hypercalcemia.  He has no known radiation exposures. He denies bone fractures, joint or bone pain, memory loss, depression, fatigue, muscle weakness, abdominal pain, nausea, constipation, but does have polyuria and polydipsia. Yifan denies recent shortness of breath, voice changes, dysphagia, anterior neck pain, neck pressure or mass. There is no family history of thyroid cancer. He denies any known radiation exposures in his youth. \par   [FreeTextEntry1] : Yifan returns to review blood results, bone density, a 4-D neck CT and ultrasound reports after Endocrine consultation to again consider a MIP.  There had been a question in the past of primary versus secondary hyperparathyroidism.  In 2014 he had both elevation of his iPTH as well as mild hypercalcemia (61.7 pg/ml/ 10.3 mg/dl) and hypercalciuria, initially confirming the diagnosis of primary hyperparathyroidism. His calcium and iPTH levels have fluctuated since then but he has had a non-suppressed PTH in association with an elevated serum calcium level. However, his last PTH was low at 19 pg/ml with a normal ionized calcium of 5.6 (ULN) and serum calcium of 10.9 but elevated Albumin at 5.3 mg/dl.   He has always had a normal but nonsuppressed PTH when the serum calcium was only slightly increased.  He may have autoimmune thyroiditis based on neck CT findings. He has no renal stones on renal US and normal GFR.  He has seen a urologist for f/u.  A bone density study showed osteopenia in the left radius (-1.4). A 4-D CT scan of the neck confirmed a 1 cm left inferior parathyroid adenoma with typical contrast enhancement and washout characteristics.  I reviewed the neck CT from 2018 with Viktor Brooke and he feels strongly that the enhancement characteristics are highly supportive for a left inferior parathyroid adenoma. Calcium last May was 10.0 with a nonsuppressed PTH of 21 consistent with PHPT.  Although he appears to be asymptomatic, based on his age under 50 years and also osteopenia in his forearm, he meets NIH consensus criteria for a MIP.  There were no significant thyroid nodules or other enlarged parathyroid glands on imaging. Genetic w/u negative for MEN I or MEN II.  He denies fever, body aches, cough, cyanosis, chest burning, anosmia or recent known COVID exposures.  All family members at home are well. He is now vaccinated. He is using CPAP now x 2 years for OSAS.  He had a bad few days with GERD and had throat irritation and chest discomfort managed with Pepcid AC BID.  His symptoms had improved. This past June had pericardiac window for pericarditis, possibly related to the COVID vaccine. Last month he had a colonoscopy and removal of a benign polyp. He also had an EGD for severe GERD managed with Prilosec and removal of a tubular adenoma.  His most recent blood work last month revealed a serum calcium level of 10.0, phosphate 3.6, GFR of 62, intact parathyroid hormone level of 20 and a low vitamin D 25 OH total at 24. He had seen a Nephrologist and has Stage 2 CKD. He is being monitored at this point in time.

## 2021-12-17 ENCOUNTER — APPOINTMENT (OUTPATIENT)
Dept: UROLOGY | Facility: CLINIC | Age: 46
End: 2021-12-17
Payer: COMMERCIAL

## 2021-12-17 VITALS — DIASTOLIC BLOOD PRESSURE: 97 MMHG | SYSTOLIC BLOOD PRESSURE: 136 MMHG | HEART RATE: 91 BPM | TEMPERATURE: 98.3 F

## 2021-12-17 PROCEDURE — 99213 OFFICE O/P EST LOW 20 MIN: CPT

## 2021-12-17 RX ORDER — OLMESARTAN MEDOXOMIL 20 MG/1
20 TABLET, FILM COATED ORAL DAILY
Refills: 0 | Status: DISCONTINUED | COMMUNITY
Start: 2019-12-12 | End: 2021-12-17

## 2021-12-17 RX ORDER — ASPIRIN 81 MG/1
81 TABLET ORAL
Refills: 0 | Status: ACTIVE | COMMUNITY

## 2021-12-20 ENCOUNTER — NON-APPOINTMENT (OUTPATIENT)
Age: 46
End: 2021-12-20

## 2021-12-20 LAB
APPEARANCE: CLEAR
BACTERIA UR CULT: NORMAL
BACTERIA: NEGATIVE
BILIRUBIN URINE: NEGATIVE
BLOOD URINE: NEGATIVE
COLOR: NORMAL
GLUCOSE QUALITATIVE U: NEGATIVE
HYALINE CASTS: 1 /LPF
KETONES URINE: NEGATIVE
LEUKOCYTE ESTERASE URINE: ABNORMAL
MICROSCOPIC-UA: NORMAL
NITRITE URINE: NEGATIVE
PH URINE: 6
PROTEIN URINE: NEGATIVE
PSA FREE FLD-MCNC: 13 %
PSA FREE SERPL-MCNC: 0.05 NG/ML
PSA SERPL-MCNC: 0.4 NG/ML
RED BLOOD CELLS URINE: 1 /HPF
SPECIFIC GRAVITY URINE: 1.01
SQUAMOUS EPITHELIAL CELLS: 1 /HPF
UROBILINOGEN URINE: NORMAL
WHITE BLOOD CELLS URINE: 6 /HPF

## 2022-06-02 ENCOUNTER — APPOINTMENT (OUTPATIENT)
Dept: ENDOCRINOLOGY | Facility: CLINIC | Age: 47
End: 2022-06-02
Payer: COMMERCIAL

## 2022-06-02 VITALS
SYSTOLIC BLOOD PRESSURE: 113 MMHG | DIASTOLIC BLOOD PRESSURE: 76 MMHG | BODY MASS INDEX: 30.07 KG/M2 | WEIGHT: 203 LBS | HEIGHT: 69 IN | HEART RATE: 101 BPM

## 2022-06-02 DIAGNOSIS — I73.00 RAYNAUD'S SYNDROME W/OUT GANGRENE: ICD-10-CM

## 2022-06-02 PROCEDURE — 99214 OFFICE O/P EST MOD 30 MIN: CPT

## 2022-06-02 RX ORDER — OLMESARTAN MEDOXOMIL 40 MG/1
TABLET, FILM COATED ORAL
Refills: 0 | Status: ACTIVE | COMMUNITY

## 2022-06-02 RX ORDER — PNV NO.95/FERROUS FUM/FOLIC AC 28MG-0.8MG
TABLET ORAL
Refills: 0 | Status: ACTIVE | COMMUNITY

## 2022-06-02 RX ORDER — IRON/IRON ASP GLY/FA/MV-MIN 38 125-25-1MG
TABLET ORAL
Refills: 0 | Status: ACTIVE | COMMUNITY

## 2022-06-03 LAB
25(OH)D3 SERPL-MCNC: 32.9 NG/ML
ALBUMIN SERPL ELPH-MCNC: 5.4 G/DL
ALP BLD-CCNC: 37 U/L
ALT SERPL-CCNC: 23 U/L
ANION GAP SERPL CALC-SCNC: 14 MMOL/L
AST SERPL-CCNC: 22 U/L
BILIRUB SERPL-MCNC: 0.6 MG/DL
BUN SERPL-MCNC: 19 MG/DL
CALCIUM SERPL-MCNC: 10.8 MG/DL
CALCIUM SERPL-MCNC: 10.8 MG/DL
CHLORIDE SERPL-SCNC: 101 MMOL/L
CO2 SERPL-SCNC: 24 MMOL/L
CREAT SERPL-MCNC: 1.35 MG/DL
EGFR: 64 ML/MIN/1.73M2
GLUCOSE SERPL-MCNC: 78 MG/DL
MAGNESIUM SERPL-MCNC: 2 MG/DL
PARATHYROID HORMONE INTACT: 18 PG/ML
PHOSPHATE SERPL-MCNC: 2.9 MG/DL
POTASSIUM SERPL-SCNC: 4.4 MMOL/L
PROT SERPL-MCNC: 7.6 G/DL
SODIUM SERPL-SCNC: 139 MMOL/L

## 2022-06-06 ENCOUNTER — APPOINTMENT (OUTPATIENT)
Dept: RADIOLOGY | Facility: CLINIC | Age: 47
End: 2022-06-06
Payer: COMMERCIAL

## 2022-06-06 ENCOUNTER — OUTPATIENT (OUTPATIENT)
Dept: OUTPATIENT SERVICES | Facility: HOSPITAL | Age: 47
LOS: 1 days | End: 2022-06-06

## 2022-06-06 ENCOUNTER — RESULT REVIEW (OUTPATIENT)
Age: 47
End: 2022-06-06

## 2022-06-06 DIAGNOSIS — Z90.89 ACQUIRED ABSENCE OF OTHER ORGANS: Chronic | ICD-10-CM

## 2022-06-06 DIAGNOSIS — Z90.49 ACQUIRED ABSENCE OF OTHER SPECIFIED PARTS OF DIGESTIVE TRACT: Chronic | ICD-10-CM

## 2022-06-06 LAB — CA-I SERPL-SCNC: 5.4 MG/DL

## 2022-06-06 PROCEDURE — 77080 DXA BONE DENSITY AXIAL: CPT | Mod: 26

## 2022-06-08 NOTE — PHYSICAL EXAM
[Alert] : alert [Healthy Appearance] : healthy appearance [No Acute Distress] : no acute distress [Normal Sclera/Conjunctiva] : normal sclera/conjunctiva [No Stigmata of Cushings Syndrome] : no stigmata of Cushings Syndrome [Normal Gait] : normal gait [Normal Insight/Judgement] : insight and judgment were intact [Normal Hearing] : hearing was normal [No Respiratory Distress] : no respiratory distress [Kyphosis] : no kyphosis present [Acanthosis Nigricans] : no acanthosis nigricans [de-identified] : no moon facies, no supraclavicular fat pads

## 2022-06-08 NOTE — ASSESSMENT
[FreeTextEntry1] : Primary hyperparathyroidism. Osteopenia. He was first noted to have mild hypercalcemia in 2014 per his report. He has hypercalcemia with elevated or inappropriately normal PTH concentrations (over 20 pg/mL) consistent with primary hyperparathyroidism. Evaluation for other causes of hypercalcemia unremarkable. There is no indication to correct serum calcium for high albumin. We discussed the complications of primary hyperparathyroidism, including but not limited to hypercalcemia, nephrolithiasis, and osteoporosis. He has osteopenia on bone density testing; his 10 year fracture risk calculated by FRAX is 2.7% for major osteoporotic fracture and 0.1% for hip fracture, below the treatment thresholds for pharmacologic osteoporosis therapy. We discussed my recommendation for parathyroid surgery due to age. We discussed the recommendations for calcium and vitamin D intake; there is no indication to restrict calcium intake in patients with primary hyperparathyroidism. \par Monitor calciotropic panel and interval bone density testing\par Calcium 1000 mg daily from diet and supplements (to be taken in divided doses as no more than 500-600 mg can be absorbed at one time); advised dietary calcium\par Continue current vitamin D regimen pending level\par Recommend follow-up with Dr. Viktor Engle for further consideration of parathyroid surgery\par \par CC:\par Dr. Marquise Alcantar, Fax [phone]\par Dr. Kyra Barajas, Fax [phone]

## 2022-06-08 NOTE — ADDENDUM
[FreeTextEntry1] : Recent laboratory results as below. Serum calcium elevated within 1 mg/dL above the upper normal limit. PTH relatively low but has been above 20 pg/mL; previous evaluation for secondary causes of hypercalcemia unremarkable. Renal function around baseline. Vitamin D around goal. I left a telephone message for Mr. White to discuss. 6/03/22\par \par Ionized calcium at the upper end of normal. 6/06/22\par \par Recent bone density as below. Bone density is within the normal range at all sites. Vertebral fracture assessment without evidence of compression fractures. I left a telephone message for Mr. White to discuss. 6/08/22

## 2022-06-08 NOTE — DATA REVIEWED
[FreeTextEntry1] : Laboratories (March 22, 2022) reviewed and significant for: \par Calcium 11.0 mg/dL (albumin 5.5 g/dL; normal: 8.7-10.2)\par BUN/creatinine 17/1.20 mg/dL (eGFR 75 mL/min)\par Alkaline phosphatase 40 U/L (normal: )\par \par Laboratories (March 8, 2022) reviewed and significant for: \par Unremarkable complete blood count\par Calcium 11.3 mg/dL (albumin 5.4 g/dL; normal: 8.7-10.2)\par BUN/creatinine 19/1.35 mg/dL (eGFR 65 mL/min)\par Alkaline phosphatase 38 U/L (normal: )\par Serum protein electrophoresis without monoclonal proteins

## 2022-06-08 NOTE — HISTORY OF PRESENT ILLNESS
[FreeTextEntry1] : Mr. White is a 47 year-old man with a history of primary hyperparathyroidism presenting for follow-up. I saw him for an initial visit in October 2020 and last in May 2021.\par \par Primary hyperparathyroidism. \par He was first diagnosed with hypercalcemia in 2014 per his report. Serum calcium has been within 1 mg/dL above the upper normal limit with inappropriately normal PTH concentrations; serum calcium was at the upper limit of normal for some period of time. PTH values have trended down and were as low as 19 pg/mL, but others all over 20 pg/mL. \par Neck ultrasound and computed tomography demonstrate a left inferior 1.0 cm parathyroid adenoma. \par No history of nephrolithiasis. Renal ultrasound in October 2020 without nephrolithiasis. 24 hour urine calcium excretion 273 mg in October 2016 and 246 mg in March 2021.\par No history of fracture. Bone density from October 2020 as below, significant for T-scores of +0.1 at the lumbar spine, -1.0 at the femoral neck, +0.2 at the total hip, -1.4 at the distal radius. Vertebral fracture assessment without evidence of compression fractures.\par He has cereal with milk daily, approximately 1/2 cup. He is taking vitamin D 2000 intl units daily. No calcium supplement or multivitamin.\par No family history of calcium disorders or endocrine tumors. Mother and father with history of hypothyroidism. \par Genetic testing from December 2020 negative for pathogenic mutations in the following genes: AP2S1, CASR, CDC73, CDKN1B, GNA11, MEN1, RET.\par \par Interim History \par He was diagnosed with pericarditis last year and is status post a pericardial window.\par He was diagnosed with Raynaud's phenomenon in his feet. There was initial concern for scleroderma, with subsequent antibody testing within range.\par He was noted to have an enlarged level V cervical lymph node; unable to be biopsied in May.\par He has been following with cardiology, hematology, and rheumatology.\par Recent laboratory results ordered by rheumatology demonstrated serum calcium values of 11.3 and 11.0 mg/dL in March.\par Medical and surgical history, medications, allergies, social and family history reviewed and updated as needed.

## 2022-06-14 ENCOUNTER — APPOINTMENT (OUTPATIENT)
Dept: OTOLARYNGOLOGY | Facility: CLINIC | Age: 47
End: 2022-06-14
Payer: COMMERCIAL

## 2022-06-14 VITALS
WEIGHT: 203 LBS | BODY MASS INDEX: 30.07 KG/M2 | SYSTOLIC BLOOD PRESSURE: 114 MMHG | RESPIRATION RATE: 16 BRPM | HEIGHT: 69 IN | OXYGEN SATURATION: 99 % | HEART RATE: 95 BPM | TEMPERATURE: 97.2 F | DIASTOLIC BLOOD PRESSURE: 77 MMHG

## 2022-06-14 DIAGNOSIS — J04.0 ACUTE LARYNGITIS: ICD-10-CM

## 2022-06-14 DIAGNOSIS — Z86.018 PERSONAL HISTORY OF OTHER BENIGN NEOPLASM: ICD-10-CM

## 2022-06-14 DIAGNOSIS — K21.9 ACUTE LARYNGITIS: ICD-10-CM

## 2022-06-14 PROCEDURE — 76536 US EXAM OF HEAD AND NECK: CPT

## 2022-06-14 PROCEDURE — 31575 DIAGNOSTIC LARYNGOSCOPY: CPT

## 2022-06-14 PROCEDURE — 99215 OFFICE O/P EST HI 40 MIN: CPT | Mod: 25

## 2022-06-14 NOTE — PROCEDURE
[Image(s) Captured] : image(s) captured and filed [Unable to Cooperate with Mirror] : patient unable to cooperate with mirror [Gag Reflex] : gag reflex preventing mirror examination [Topical Lidocaine] : topical lidocaine [Oxymetazoline HCl] : oxymetazoline HCl [Flexible Endoscope] : examined with the flexible endoscope [Serial Number: ___] : Serial Number: [unfilled] [FreeTextEntry3] : \par [facility]\par THYROID/NECK ULTRASOUND REPORT\par \par NAME: DESIRAE CATHERINE] .....           MR# [id number].....	              : 1975.....	         DATE: 2022.\par \par HISTORY/ INDICATIONS: A 47-year-old male with history of hypercalcemia and biochemical evidence for primary hyperparathyroidism to rule out growth of a previously imaged left inferior parathyroid adenoma and any additional thyroid disease.\par \par COMPARISON: None.\par \par PROCEDURE: Physician performed high-resolution ultrasound gray scale imaging and color Doppler supplementation of the thyroid gland and neck was obtained in the longitudinal and transverse planes using a 13 MHz linear transducer with image capture.  All measurements are in centimeters (longitudinal x AP x transverse).  \par \par FINDINGS: Overall the thyroid gland is normal in size, heterogeneous in echotexture with normal vascularity on color Doppler flow. \par \par RIGHT LOBE: Is not enlarged, heterogeneous, with normal vascularity on color Doppler and measures 4.77 x 1.52 x 2.32 cm.  NODULES: None identified.\par \par ISTHMUS: Measures 0.43 cm in AP dimension and is heterogeneous in echotexture with normal vascularity.  No nodules are identified.\par \par LEFT LOBE: Is not enlarged, heterogeneous, with normal vascularity on color Doppler and measures 4.82 x 1.18 x 1.91 cm. NODULES: None identified.\par \par PARATHYROID GLANDS: Inferior and posterior to the left lower thyroid lobe is a oblong hypoechoic smoothly marginated structure  by an echogenic line with a well-defined vascular pedicle that measures 0.94 x 0.47 x 0.82 cm compatible with a parathyroid adenoma.  There are no other identified enlarged parathyroid glands in the central neck compartment. \par \par LYMPH NODES: Bilateral neck levels I - VI were examined.  There are several benign appearing subcentimeter lymph nodes identified at neck levels II- III bilaterally (lateral neck), all with echogenic hilar lines, no calcifications or cystic degeneration and have a short long axis ratio < 0.5 in the transverse plane.  There are no enlarged or abnormal appearing central compartment, level VI lymph nodes.  A right level VB right posterior lymph node has a central echogenic fatty hilum and measures approximately 6 mm in greatest dimension.\par \par IMPRESSION: A 47-year-old male with primary hyperparathyroidism, a normal thyroid gland other than marked heterogeneity consistent with possible autoimmune thyroiditis and evidence for a left inferior parathyroid adenoma now measuring approximately 9 mm greatest dimension.  There are no enlarged or morphologically abnormal appearing cervical lymph nodes.\par \par RECOMMENDATIONS: Repeat thyroid ultrasound in 1 year as well as continued monitoring of TSH, calcium and parathyroid hormone levels if parathyroidectomy is not under consideration.\par \par Electronically signed by referring, interpreting and reporting physician Viktor Engle MD on 2022, 5:40 PM.\par \par [facility]\par [address], [address], [place] [address]\par [phone] (voice), [phone] (fax) [de-identified] : The nasal septum is minimally deviated to the right with a spur. The mucosa is pale and dry from use of CPAP. There are no masses or polyps and the nasal mucosa and secretions are normal. The choanae and posterior nasopharynx are normal without masses or drainage. The Eustachian tube orifices appear patent. The pharynx, including the posterior and lateral pharyngeal walls, the vallecula and base of tongue are normal without ulcerations, lesions or masses. The hypopharynx including the pyriform sinuses open well without pooling of secretions, mucosal lesions or masses. The supraglottic larynx including the epiglottis, petiole, arytenoids, glossoepiglottic, aryepiglottic and pharyngoepiglottic folds are normal without mucosal lesions, ulcerations or masses. The glottis reveals normal false vocal folds.  The true vocal folds slightly hyperemic, and thickened but  tense and of equal length, without paralysis, having symmetric mobility on adduction and abduction. There are no mucosal lesions, nodules, cysts, erythroplasia or leukoplakia. The posterior cricoid area has healthy pink mucosa in the interarytenoid area and esophageal inlet. There is thickening/edema and tiger stripping of the interarytenoid mucosa suggestive of posterior laryngitis from laryngopharyngeal acid reflux disease. The trachea is clear without narrowing in the immediate subglottic region, without deviation or lesions.  [de-identified] : sleep apnea, parathyroid adenoma for preop assessment

## 2022-06-14 NOTE — CONSULT LETTER
[Dear  ___] : Dear  [unfilled], [Consult Letter:] : I had the pleasure of evaluating your patient, [unfilled]. [Please see my note below.] : Please see my note below. [Consult Closing:] : Thank you very much for allowing me to participate in the care of this patient.  If you have any questions, please do not hesitate to contact me. [Sincerely,] : Sincerely, [DrMalia  ___] : Dr. ESTRELLA [DrMalia ___] : Dr. ESTRELLA [FreeTextEntry3] : \par Viktor Engle M.D., FACS, ECNU\par Director [facility]roid [facility]\par [facility]\par Certified in Thyroid/Parathyroid/Neck Ultrasound, ECNU/ AIUM\par \par , Department of Otolaryngology\par [facility]\par

## 2022-06-14 NOTE — HISTORY OF PRESENT ILLNESS
[de-identified] : Yifan is a generally healthy 43-year-old male  in the [employer] who was first noted 4-5 years ago to have mild hypercalcemia and had been monitored for this.  His calcium level most recently has been in the high normal range with a normal but nonsuppressed iPTH and a low vitamin D 25-OH total (25.9).  He has normal renal function.  He has no history of nephrolithiasis. He is euthyroid and a recent thyroid ultrasound revealed bilateral subcentimeter colloid cysts and two potential candidates for enlarged parathyroid glands that were described as extra-thyroidal adjacent to the posterior/inferior thyroid lobes measuring 9 mm (RT) and 10 mm (LT).  There were no abnormal cervical lymph nodes identified.  Both of his parents have or had hypothyroidism.  There is no family history of thyroid cancer or hypercalcemia.  He has no known radiation exposures. He denies bone fractures, joint or bone pain, memory loss, depression, fatigue, muscle weakness, abdominal pain, nausea, constipation, but does have polyuria and polydipsia. Yifan denies recent shortness of breath, voice changes, dysphagia, anterior neck pain, neck pressure or mass. There is no family history of thyroid cancer. He denies any known radiation exposures in his youth. \par   [FreeTextEntry1] : Yifan returns to review blood results, bone density, a 4-D neck CT and ultrasound reports after Endocrine consultation to again consider a MIP.  There had been a question in the past of primary versus secondary hyperparathyroidism.  In 2014 he had both elevation of his iPTH as well as mild hypercalcemia (61.7 pg/ml/ 10.3 mg/dl) and hypercalciuria, initially confirming the diagnosis of primary hyperparathyroidism. His calcium and iPTH levels have fluctuated since then but he has had a non-suppressed PTH in association with an elevated serum calcium level and multiple elevated ionized calcium levels.  However, his last PTH was low at 18 pg/ml with a serum calcium of 10.8.  He has had serum calcium levels as high as 11.3.  He has always had a normal but nonsuppressed PTH when the serum calcium was only slightly increased. All other causes for hypercalcemia have been eliminated including a normal SPEP, normal ACE, negative CaSR mutation profile, PTH related peptide <2.0, and no other good explanation for his biochemical results other than a parathyroid adenoma imaged on a good 4D CT study reviewed with Viktor Brooke today.  He may also have autoimmune thyroiditis based on neck CT findings. He has no renal stones on renal US and normal GFR but his recent creatinine levels have been above normal ands some concern that this could be related to PHPT. He has seen a urologist for f/u.  A bone density study showed osteopenia in the left radius (-1.4). A 4-D CT scan of the neck confirmed a 1 cm left inferior parathyroid adenoma with typical contrast enhancement and washout characteristics and reviewed again today with Viktor Brooke and he feels strongly that the enhancement characteristics are highly supportive for a parathyroid adenoma.  Although he appears to be asymptomatic, based on his age under 50 years and also osteopenia in his forearm, he meets NIH consensus criteria for a MIP.  There were no significant thyroid nodules or other enlarged parathyroid glands on imaging. Genetic w/u was negative for MEN I or MEN II.  He denies fever, body aches, cough, cyanosis, chest burning, anosmia or recent known COVID exposures.  All family members at home are well. He is now vaccinated. He is using CPAP now x 4 years for OSAS.  He has GERD managed with Pepcid AC 20 mg BID.  This past June had pericardiac window for pericarditis, possibly related to the COVID vaccine.  He had a colonoscopy and removal of a benign polyp in November 2021. He also had an EGD for severe GERD and removal of a tubular adenoma.  His most recent blood work last month revealed a serum calcium level of 10.8, phosphate 3.6, GFR of 64, creatinine 1.35, intact parathyroid hormone level of 18 and a normal vitamin D 25 OH total at 32.9. He had seen a Nephrologist and has stage 2 CKD. He is being monitored at this point in time but referred back by his Endocrinologist who is convinced he has primary hyperparathyroidism and has recommended parathyroid surgery.

## 2022-06-14 NOTE — DATA REVIEWED
[de-identified] : see HPI [de-identified] : see HPI [de-identified] : see HPI: 4-D CT reviewed with Viktor Brooke (radiologist)

## 2022-06-14 NOTE — REASON FOR VISIT
[FreeTextEntry2] : follow up surgical consultation for primary hyperparathyroidism, a possible parathyroid adenoma and follow up after 4-D neck CT scan, Sestamibi scan for parathyroid localization, DEXA scan, Endocrine consult and repeat labs. Also sleep apnea.  [FreeTextEntry1] : Stephanie Amezcua MD, PCP, Adners Dela Cruz MD Cardiologist,  Ethel Domingo MD  Endocrinologist, Kyra Barajas MD, Donnell Amezcua MD Hematologist

## 2022-06-17 ENCOUNTER — APPOINTMENT (OUTPATIENT)
Dept: UROLOGY | Facility: CLINIC | Age: 47
End: 2022-06-17
Payer: COMMERCIAL

## 2022-06-17 VITALS — HEART RATE: 101 BPM | TEMPERATURE: 97.7 F | DIASTOLIC BLOOD PRESSURE: 80 MMHG | SYSTOLIC BLOOD PRESSURE: 123 MMHG

## 2022-06-17 PROCEDURE — 51798 US URINE CAPACITY MEASURE: CPT

## 2022-06-17 PROCEDURE — 99212 OFFICE O/P EST SF 10 MIN: CPT | Mod: 25

## 2022-06-17 NOTE — HISTORY OF PRESENT ILLNESS
[FreeTextEntry1] : history hyperparathyroodisim\par rising creatinine to 1.35\par referred by pcp to eval for retention\par PSA 0.4\par nocutria x 1\par UA 1 rbc\par mild hesitancy\par no heamtruai\par +frequency\par

## 2022-06-17 NOTE — ASSESSMENT
[FreeTextEntry1] : BPH\par PVR to r/o retention 150cc\par no evidence retention to explain rising creat\par cleared from urology perspective for hyperparathyroid surgery\par

## 2022-06-20 LAB
APPEARANCE: CLEAR
BACTERIA UR CULT: NORMAL
BACTERIA: NEGATIVE
BILIRUBIN URINE: NEGATIVE
BLOOD URINE: NEGATIVE
COLOR: NORMAL
GLUCOSE QUALITATIVE U: NEGATIVE
HYALINE CASTS: 0 /LPF
KETONES URINE: NEGATIVE
LEUKOCYTE ESTERASE URINE: NEGATIVE
MICROSCOPIC-UA: NORMAL
NITRITE URINE: NEGATIVE
PH URINE: 7
PROTEIN URINE: NEGATIVE
RED BLOOD CELLS URINE: 1 /HPF
SPECIFIC GRAVITY URINE: 1.01
SQUAMOUS EPITHELIAL CELLS: 0 /HPF
UROBILINOGEN URINE: NORMAL
WHITE BLOOD CELLS URINE: 1 /HPF

## 2022-07-05 ENCOUNTER — APPOINTMENT (OUTPATIENT)
Dept: OTOLARYNGOLOGY | Facility: CLINIC | Age: 47
End: 2022-07-05

## 2022-07-05 VITALS
BODY MASS INDEX: 30.07 KG/M2 | RESPIRATION RATE: 16 BRPM | OXYGEN SATURATION: 100 % | DIASTOLIC BLOOD PRESSURE: 81 MMHG | HEART RATE: 102 BPM | SYSTOLIC BLOOD PRESSURE: 123 MMHG | TEMPERATURE: 97.9 F | HEIGHT: 69 IN | WEIGHT: 203 LBS

## 2022-07-05 PROCEDURE — 99214 OFFICE O/P EST MOD 30 MIN: CPT | Mod: 25

## 2022-07-05 RX ORDER — CEFUROXIME AXETIL 250 MG/1
250 TABLET ORAL
Qty: 28 | Refills: 0 | Status: ACTIVE | COMMUNITY
Start: 2022-06-07

## 2022-07-05 RX ORDER — NAPROXEN SODIUM 550 MG/1
550 TABLET ORAL
Qty: 14 | Refills: 0 | Status: ACTIVE | COMMUNITY
Start: 2022-03-20

## 2022-07-05 NOTE — HISTORY OF PRESENT ILLNESS
[de-identified] : Yifan is a generally healthy 43-year-old male  in the [employer] industry who was first noted 4-5 years ago to have mild hypercalcemia and had been monitored for this.  His calcium level most recently has been in the high normal range with a normal but nonsuppressed iPTH and a low vitamin D 25-OH total (25.9).  He has normal renal function.  He has no history of nephrolithiasis. He is euthyroid and a recent thyroid ultrasound revealed bilateral subcentimeter colloid cysts and two potential candidates for enlarged parathyroid glands that were described as extra-thyroidal adjacent to the posterior/inferior thyroid lobes measuring 9 mm (RT) and 10 mm (LT).  There were no abnormal cervical lymph nodes identified.  Both of his parents have or had hypothyroidism.  There is no family history of thyroid cancer or hypercalcemia.  He has no known radiation exposures. He denies bone fractures, joint or bone pain, memory loss, depression, fatigue, muscle weakness, abdominal pain, nausea, constipation, but does have polyuria and polydipsia. Yifan denies recent shortness of breath, voice changes, dysphagia, anterior neck pain, neck pressure or mass. There is no family history of thyroid cancer. He denies any known radiation exposures in his youth. \par   [FreeTextEntry1] : Yifan returns to review blood results, bone density, a 4-D neck CT and ultrasound reports after Endocrine consultation to again consider a MIP.  There had been a question in the past of primary versus secondary hyperparathyroidism.  In 2014 he had both elevation of his iPTH as well as mild hypercalcemia (61.7 pg/ml/ 10.3 mg/dl) and hypercalciuria, initially confirming the diagnosis of primary hyperparathyroidism. His calcium and iPTH levels have fluctuated since then but he has had a non-suppressed PTH in association with an elevated serum calcium level and multiple elevated ionized calcium levels.  However, his last PTH was low at 18 pg/ml with a serum calcium of 10.8.  He has had serum calcium levels as high as 11.3.  He has always had a normal but nonsuppressed PTH when the serum calcium was only slightly increased. All other causes for hypercalcemia have been eliminated including a normal SPEP, normal ACE, negative CaSR mutation profile, PTH related peptide <2.0, and no other good explanation for his biochemical results other than a parathyroid adenoma imaged on a good 4D CT study reviewed with Viktor Brooke today.  He may also have autoimmune thyroiditis based on neck CT findings. He has no renal stones on renal US and normal GFR but his recent creatinine levels have been above normal ands some concern that this could be related to PHPT. He has seen a urologist for f/u.  A bone density study showed osteopenia in the left radius (-1.4). A 4-D CT scan of the neck confirmed a 1 cm left inferior parathyroid adenoma with typical contrast enhancement and washout characteristics and reviewed again today with Viktor Brooke and he feels strongly that the enhancement characteristics are highly supportive for a parathyroid adenoma.  Although he appears to be asymptomatic, based on his age under 50 years and also osteopenia in his forearm, he meets NIH consensus criteria for a MIP.  There were no significant thyroid nodules or other enlarged parathyroid glands on imaging. Genetic w/u was negative for MEN I or MEN II.  He denies fever, body aches, cough, cyanosis, chest burning, anosmia or recent known COVID exposures.  All family members at home are well. He is now vaccinated. He is using CPAP now x 4 years for OSAS.  He has GERD managed with Pepcid AC 20 mg BID.  This past June had pericardiac window for pericarditis, possibly related to the COVID vaccine.  He had a colonoscopy and removal of a benign polyp in November 2021. He also had an EGD for severe GERD and removal of a tubular adenoma.  Recent blood work last month revealed a serum calcium level of 10.8, phosphate 3.6, GFR of 64, creatinine 1.35, intact parathyroid hormone level of 18 and a normal vitamin D 25 OH total at 32.9. He had seen a Nephrologist and has stage 2 CKD. He is being monitored at this point in time but referred back by his Endocrinologist who is convinced he has primary hyperparathyroidism and has recommended parathyroid surgery.  He had repeat laboratory testing on 6/21/2022.  His serum calcium level remains elevated at 10.4 mg/DL but albumin elevated at 5.2G/DL and normal ionized calcium at 5.2.  Monoclonal antibodies were not detected.  Thyroid function is normal.  He has negative antithyroid antibodies.  Angiotensin-converting enzyme was normal at 28.  Vitamin D 25 OH total is borderline at 29.  A 24-hour urine collection for calcium was elevated at 366 mg per 24 hours.

## 2022-07-05 NOTE — CONSULT LETTER
[Dear  ___] : Dear  [unfilled], [Consult Letter:] : I had the pleasure of evaluating your patient, [unfilled]. [Please see my note below.] : Please see my note below. [Consult Closing:] : Thank you very much for allowing me to participate in the care of this patient.  If you have any questions, please do not hesitate to contact me. [Sincerely,] : Sincerely, [DrMalia  ___] : Dr. ESTRELLA [DrMalia ___] : Dr. ESTRELLA [FreeTextEntry3] : \par Viktor Engle M.D., FACS, ECNU\par Director Center for Thyroid & Parathyroid Surgery at [facility]\par [facility]\par Certified in Thyroid/Parathyroid/Neck Ultrasound, ECNU/ AIUM\par , Department of Otolaryngology\par [facility]\par  \par

## 2022-07-05 NOTE — REASON FOR VISIT
[FreeTextEntry2] : follow up surgical consultation for primary hyperparathyroidism, a possible parathyroid adenoma and follow up after 4-D neck CT scan, Sestamibi scan for parathyroid localization, DEXA scan, Endocrine consult and repeat labs. Also sleep apnea.  [FreeTextEntry1] : Stephanie Amezcua MD, PCP, Anders Dela Cruz MD Cardiologist,  Ethel Domingo MD  Endocrinologist, Kyra Barajas MD, Donnell Amezcua MD Hematologist

## 2022-07-05 NOTE — DATA REVIEWED
[de-identified] : see HPI [de-identified] : see HPI [de-identified] : see HPI: 4-D CT reviewed with Viktor Brooke (radiologist)

## 2022-07-15 ENCOUNTER — TRANSCRIPTION ENCOUNTER (OUTPATIENT)
Age: 47
End: 2022-07-15

## 2022-11-02 ENCOUNTER — NON-APPOINTMENT (OUTPATIENT)
Age: 47
End: 2022-11-02

## 2022-11-03 ENCOUNTER — APPOINTMENT (OUTPATIENT)
Dept: UROLOGY | Facility: CLINIC | Age: 47
End: 2022-11-03

## 2022-11-03 VITALS — HEART RATE: 87 BPM | DIASTOLIC BLOOD PRESSURE: 75 MMHG | TEMPERATURE: 98.1 F | SYSTOLIC BLOOD PRESSURE: 116 MMHG

## 2022-11-03 PROCEDURE — 99213 OFFICE O/P EST LOW 20 MIN: CPT

## 2022-11-03 NOTE — ASSESSMENT
[FreeTextEntry1] : left gorin pain\par likely muscular\par PSA 12/2021 0.40\par UA UCXtoday\par no sign hernia\par renal bladder us 7/2022 - no stones. mild PVR\par

## 2022-11-03 NOTE — HISTORY OF PRESENT ILLNESS
[FreeTextEntry1] : hisotry hyperparathyryodism\par complains of groin pain left lower side\par no dysuria\par no hematuria\par no testicular pain\par mild\par

## 2022-11-03 NOTE — PHYSICAL EXAM
[Urethral Meatus] : meatus normal [Penis Abnormality] : normal circumcised penis [Urinary Bladder Findings] : the bladder was normal on palpation [Scrotum] : the scrotum was normal [Epididymis] : the epididymides were normal [Testes Tenderness] : no tenderness of the testes [Testes Mass (___cm)] : there were no testicular masses [FreeTextEntry1] : no inguinal hernai

## 2022-11-07 LAB
APPEARANCE: CLEAR
BACTERIA UR CULT: NORMAL
BACTERIA: NEGATIVE
BILIRUBIN URINE: NEGATIVE
BLOOD URINE: NEGATIVE
COLOR: COLORLESS
GLUCOSE QUALITATIVE U: NEGATIVE
HYALINE CASTS: 0 /LPF
KETONES URINE: NEGATIVE
LEUKOCYTE ESTERASE URINE: NEGATIVE
MICROSCOPIC-UA: NORMAL
NITRITE URINE: NEGATIVE
PH URINE: 6.5
PROTEIN URINE: NEGATIVE
RED BLOOD CELLS URINE: 1 /HPF
SPECIFIC GRAVITY URINE: 1.01
SQUAMOUS EPITHELIAL CELLS: 0 /HPF
UROBILINOGEN URINE: NORMAL
WHITE BLOOD CELLS URINE: 1 /HPF

## 2022-11-17 ENCOUNTER — APPOINTMENT (OUTPATIENT)
Dept: ENDOCRINOLOGY | Facility: CLINIC | Age: 47
End: 2022-11-17

## 2022-11-17 VITALS
BODY MASS INDEX: 30.27 KG/M2 | DIASTOLIC BLOOD PRESSURE: 82 MMHG | HEART RATE: 99 BPM | SYSTOLIC BLOOD PRESSURE: 131 MMHG | WEIGHT: 205 LBS

## 2022-11-17 DIAGNOSIS — Z87.39 PERSONAL HISTORY OF OTHER DISEASES OF THE MUSCULOSKELETAL SYSTEM AND CONNECTIVE TISSUE: ICD-10-CM

## 2022-11-17 PROCEDURE — 99214 OFFICE O/P EST MOD 30 MIN: CPT

## 2022-11-17 NOTE — HISTORY OF PRESENT ILLNESS
[FreeTextEntry1] : Mr. White is a 47 year-old man with a history of primary hyperparathyroidism presenting for follow-up. I saw him for an initial visit in October 2020 and last in June 2022.\par \par Primary hyperparathyroidism. \par He was first diagnosed with hypercalcemia in 2014 per his report. Serum calcium has been within 1 mg/dL above the upper normal limit with inappropriately normal PTH concentrations; serum calcium was at the upper limit of normal for some period of time. PTH values have trended down and were as low as 19 pg/mL, but others all over 20 pg/mL. \par Neck ultrasound and computed tomography demonstrate a left inferior 1.0 cm parathyroid adenoma. \par No history of nephrolithiasis. Renal ultrasound in October 2020 without nephrolithiasis. 24 hour urine calcium excretion 366 mg in June 2022.\par No history of fracture. Bone density from October 2020 as below, significant for T-scores of +0.1 at the lumbar spine, -1.0 at the femoral neck, +0.2 at the total hip, -1.4 at the distal radius. Vertebral fracture assessment without evidence of compression fractures. Bone density from June 2022 as below, significant for T-scores of +0.4 at the lumbar spine, -0.5 at the femoral neck, +0.5 at the total hip, and -0.9 at the distal radius.\par He has cereal with milk daily, approximately 1/2 cup. He is taking vitamin D 3000 intl units daily. No calcium supplement or multivitamin.\par No family history of calcium disorders or endocrine tumors. Mother and father with history of hypothyroidism. \par Genetic testing from December 2020 negative for pathogenic mutations in the following genes: AP2S1, CASR, CDC73, CDKN1B, GNA11, MEN1, RET.\par \par Interim History \par Laboratory results from June with serum calcium 10.4 mg/dL but normal ionized calcium. 24 hour urine calcium excretion elevated. Most recent serum calcium 10.9 mg/dL in October. \par He was noted to have a lymph node in his neck with benign morphology.\par Bone density from June as below. Bone density is within the normal range at all sites. Vertebral fracture assessment without evidence of compression fractures.\par He has seen Isabel Engle and Charlotte Pena; notes reviewed.\par Medical and surgical history, medications, allergies, social and family history reviewed and updated as needed.

## 2022-11-17 NOTE — DATA REVIEWED
[FreeTextEntry1] : Laboratories (October 14, 2022) reviewed and significant for: \par Calcium 10.9 mg/dL (albumin 5.2 g/dL; normal: 8.6-10.3)\par BUN/creatinine 10/1.25 mg/dL (eGFR 71 mL/min)\par Alkaline phosphatase 32 U/L (normal: )\par \par Laboratories (June 21, 2022) reviewed and significant for: \par Calcium 10.4 mg/dL (albumin 5.2 g/dL; normal: 8.6-10.3)\par Ionized calcium 5.2 mg/dL\par 25-hydroxyvitamin D 29 ng/mL\par 1,25-dihydroxyvitamin D 36 pg/mL\par BUN/creatinine 11/1.29 mg/dL (eGFR 65 mL/min)\par Phosphorus 3.1 mg/dL\par TSH 2.61 uIU/mL\par Angiotensin converting enzyme 28 U/L\par Serum protein electrophoresis without monoclonal proteins\par 24 hour urine calcium 366 mg (normal: )\par \[facility] (March 22, 2022) reviewed and significant for: \par Calcium 11.0 mg/dL (albumin 5.5 g/dL; normal: 8.7-10.2)\par BUN/creatinine 17/1.20 mg/dL (eGFR 75 mL/min)\par Alkaline phosphatase 40 U/L (normal: )\par \[facility] (March 8, 2022) reviewed and significant for: \par Unremarkable complete blood count\par Calcium 11.3 mg/dL (albumin 5.4 g/dL; normal: 8.7-10.2)\par BUN/creatinine 19/1.35 mg/dL (eGFR 65 mL/min)\par Alkaline phosphatase 38 U/L (normal: )\par Serum protein electrophoresis without monoclonal proteins

## 2022-11-17 NOTE — PHYSICAL EXAM
[Alert] : alert [Healthy Appearance] : healthy appearance [No Acute Distress] : no acute distress [Normal Sclera/Conjunctiva] : normal sclera/conjunctiva [Normal Hearing] : hearing was normal [No Respiratory Distress] : no respiratory distress [No Stigmata of Cushings Syndrome] : no stigmata of Cushings Syndrome [Normal Gait] : normal gait [Normal Insight/Judgement] : insight and judgment were intact [Kyphosis] : no kyphosis present [Acanthosis Nigricans] : no acanthosis nigricans [de-identified] : no moon facies, no supraclavicular fat pads

## 2022-11-17 NOTE — ASSESSMENT
[FreeTextEntry1] : Primary hyperparathyroidism. History of osteopenia. Hypercalciuria. He was first noted to have mild hypercalcemia in 2014 per his report. He has hypercalcemia with elevated or inappropriately normal PTH concentrations (over 20 pg/mL) consistent with primary hyperparathyroidism. Evaluation for other causes of hypercalcemia unremarkable. There is no indication to correct serum calcium for high albumin. We discussed the complications of primary hyperparathyroidism, including but not limited to hypercalcemia, nephrolithiasis, and osteoporosis. We discussed my recommendation for parathyroid surgery due to age and hypercalciuria. We discussed the recommendations for calcium and vitamin D intake; there is no indication to restrict calcium intake in patients with primary hyperparathyroidism.\par Calcium 1000 mg daily from diet and supplements (to be taken in divided doses as no more than 500-600 mg can be absorbed at one time); advised dietary calcium\par Continue current vitamin D regimen\par Recommend follow-up with Dr. Viktor Engle for further consideration of parathyroid surgery\par \par CC:\par DEBBY Beltrán, Fax [phone]\par Dr. Kyra Barajas, Fax [phone]

## 2022-12-20 ENCOUNTER — APPOINTMENT (OUTPATIENT)
Dept: UROLOGY | Facility: CLINIC | Age: 47
End: 2022-12-20

## 2022-12-20 VITALS — DIASTOLIC BLOOD PRESSURE: 75 MMHG | HEART RATE: 105 BPM | SYSTOLIC BLOOD PRESSURE: 112 MMHG | TEMPERATURE: 98.3 F

## 2022-12-20 PROCEDURE — 99213 OFFICE O/P EST LOW 20 MIN: CPT

## 2022-12-20 NOTE — HISTORY OF PRESENT ILLNESS
[FreeTextEntry1] : returns for followup\par no bother\par no voiding complaints\par PSA 12/2021 0.40\par

## 2022-12-28 LAB
APPEARANCE: CLEAR
BACTERIA UR CULT: NORMAL
BACTERIA: NEGATIVE
BILIRUBIN URINE: NEGATIVE
BLOOD URINE: NEGATIVE
COLOR: COLORLESS
GLUCOSE QUALITATIVE U: NEGATIVE
HYALINE CASTS: 0 /LPF
KETONES URINE: NEGATIVE
LEUKOCYTE ESTERASE URINE: NEGATIVE
MICROSCOPIC-UA: NORMAL
NITRITE URINE: NEGATIVE
PH URINE: 6.5
PROTEIN URINE: NORMAL
PSA SERPL-MCNC: 0.29 NG/ML
RED BLOOD CELLS URINE: 3 /HPF
SPECIFIC GRAVITY URINE: 1.01
SQUAMOUS EPITHELIAL CELLS: 0 /HPF
UROBILINOGEN URINE: NORMAL
WHITE BLOOD CELLS URINE: 1 /HPF

## 2023-02-15 ENCOUNTER — APPOINTMENT (OUTPATIENT)
Dept: OTOLARYNGOLOGY | Facility: CLINIC | Age: 48
End: 2023-02-15
Payer: COMMERCIAL

## 2023-02-15 VITALS
DIASTOLIC BLOOD PRESSURE: 69 MMHG | BODY MASS INDEX: 30.36 KG/M2 | TEMPERATURE: 97.3 F | OXYGEN SATURATION: 98 % | WEIGHT: 205 LBS | HEART RATE: 100 BPM | SYSTOLIC BLOOD PRESSURE: 122 MMHG | RESPIRATION RATE: 16 BRPM | HEIGHT: 69 IN

## 2023-02-15 PROCEDURE — 76536 US EXAM OF HEAD AND NECK: CPT

## 2023-02-15 PROCEDURE — 99215 OFFICE O/P EST HI 40 MIN: CPT | Mod: 25

## 2023-02-15 PROCEDURE — 31575 DIAGNOSTIC LARYNGOSCOPY: CPT

## 2023-02-15 NOTE — REASON FOR VISIT
[FreeTextEntry2] : follow up surgical consultation for primary hyperparathyroidism and imaging evidence for a left inferior parathyroid adenoma. [FreeTextEntry1] : Stephanie Amezcua MD, PCP, Anders Dela Cruz MD Cardiologist,  Ethel Domingo MD  Endocrinologist, Kyra Barajas MD, Donnell Amezcua MD Hematologist

## 2023-02-15 NOTE — PROCEDURE
[Image(s) Captured] : image(s) captured and filed [Unable to Cooperate with Mirror] : patient unable to cooperate with mirror [Gag Reflex] : gag reflex preventing mirror examination [Topical Lidocaine] : topical lidocaine [Oxymetazoline HCl] : oxymetazoline HCl [Flexible Endoscope] : examined with the flexible endoscope [Serial Number: ___] : Serial Number: [unfilled] [FreeTextEntry3] : \par [facility]\par THYROID/NECK ULTRASOUND REPORT\par \par NAME: DESIRAE CATHERINE] .....           MR# [id number].....	              : 1975.....	         DATE: 2/15/2023.\par \par HISTORY/ INDICATIONS: A 47-year-old male with history of hypercalcemia and biochemical evidence for primary hyperparathyroidism to rule out growth of a previously imaged left inferior parathyroid adenoma and any additional thyroid disease.\par \par COMPARISON: Office study dated 2022.\par \par PROCEDURE: Physician performed high-resolution ultrasound gray scale imaging and color Doppler supplementation of the thyroid gland and neck was obtained in the longitudinal and transverse planes using a 13 MHz linear transducer with image capture.  All measurements are in centimeters (longitudinal x AP x transverse).  \par \par FINDINGS: Overall the thyroid gland is normal in size, heterogeneous in echotexture with normal vascularity on color Doppler flow.  The study is limited to the imaging of the parathyroid glands.\par \par PARATHYROID GLANDS: Inferior and posterior to the left lower thyroid lobe is a oblong hypoechoic smoothly marginated structure  by an echogenic line with a well-defined vascular pedicle that measures 0.83 x 0.47 x 0.80 (previously 0.94 x 0.47 x 0.82 cm) compatible with a parathyroid adenoma.  There are no other identified enlarged parathyroid glands in the central neck compartment. \par \par IMPRESSION: A 47-year-old male with primary hyperparathyroidism, a normal thyroid gland other than marked heterogeneity consistent with possible autoimmune thyroiditis and evidence for a left inferior parathyroid adenoma now measuring approximately 8 mm greatest dimension and stable. \par \par RECOMMENDATIONS: Repeat thyroid ultrasound in 1 year as well as continued monitoring of TSH, calcium and parathyroid hormone levels if parathyroidectomy is not under consideration.\par \par Electronically signed by referring, interpreting and reporting physician Viktor Engle MD on 2/15/2023, 4:20 PM.\par \par [facility]\par [address], [address], [place] [address]\par [phone] (voice), [phone] (fax) [de-identified] : The nasal septum is minimally deviated to the right with a spur. The mucosa is pale and dry from use of CPAP. There are no masses or polyps and the nasal mucosa and secretions are normal. The choanae and posterior nasopharynx are normal without masses or drainage. The Eustachian tube orifices appear patent. The pharynx, including the posterior and lateral pharyngeal walls, the vallecula and base of tongue are normal without ulcerations, lesions or masses. The hypopharynx including the pyriform sinuses open well without pooling of secretions, mucosal lesions or masses. The supraglottic larynx including the epiglottis, petiole, arytenoids, glossoepiglottic, aryepiglottic and pharyngoepiglottic folds are normal without mucosal lesions, ulcerations or masses. The glottis reveals normal false vocal folds.  The true vocal folds slightly hyperemic, and thickened but  tense and of equal length, without paralysis, having symmetric mobility on adduction and abduction. There are no mucosal lesions, nodules, cysts, erythroplasia or leukoplakia. The posterior cricoid area has healthy pink mucosa in the interarytenoid area and esophageal inlet. There is marked thickening/edema and tiger stripping of the interarytenoid mucosa suggestive of posterior laryngitis from laryngopharyngeal acid reflux disease. The trachea is clear without narrowing in the immediate subglottic region, without deviation or lesions.  [de-identified] : sleep apnea, parathyroid adenoma for preop assessment

## 2023-02-15 NOTE — HISTORY OF PRESENT ILLNESS
[de-identified] : Yifan is a generally healthy 43-year-old male  in the [employer] industry who was first noted 4-5 years ago to have mild hypercalcemia and had been monitored for this.  His calcium level most recently has been in the high normal range with a normal but nonsuppressed iPTH and a low vitamin D 25-OH total (25.9).  He has normal renal function.  He has no history of nephrolithiasis. He is euthyroid and a recent thyroid ultrasound revealed bilateral subcentimeter colloid cysts and two potential candidates for enlarged parathyroid glands that were described as extra-thyroidal adjacent to the posterior/inferior thyroid lobes measuring 9 mm (RT) and 10 mm (LT).  There were no abnormal cervical lymph nodes identified.  Both of his parents have or had hypothyroidism.  There is no family history of thyroid cancer or hypercalcemia.  He has no known radiation exposures. He denies bone fractures, joint or bone pain, memory loss, depression, fatigue, muscle weakness, abdominal pain, nausea, constipation, but does have polyuria and polydipsia. Yifan denies recent shortness of breath, voice changes, dysphagia, anterior neck pain, neck pressure or mass. There is no family history of thyroid cancer. He denies any known radiation exposures in his youth. \par   [FreeTextEntry1] : Yifan returns to review blood results, bone density, a 4-D neck CT and ultrasound reports after Endocrine consultation to again consider a MIP.  There had been a question in the past of primary versus secondary hyperparathyroidism.  In 2014 he had both elevation of his iPTH as well as mild hypercalcemia (61.7 pg/ml/ 10.3 mg/dl) and hypercalciuria, initially confirming the diagnosis of primary hyperparathyroidism. His calcium and iPTH levels have fluctuated since then but he has had a non-suppressed PTH in association with an elevated serum calcium level and multiple elevated ionized calcium levels.  However, his last PTH was low at 18 pg/ml with a serum calcium of 10.8.  He has had serum calcium levels as high as 11.3.  He has always had a normal but nonsuppressed PTH when the serum calcium was only slightly increased. All other causes for hypercalcemia have been eliminated including a normal SPEP, normal ACE, negative CaSR mutation profile, PTH related peptide <2.0, and no other good explanation for his biochemical results other than a parathyroid adenoma imaged on a good 4D CT study reviewed with Viktor Brooke today.  He may also have autoimmune thyroiditis based on neck CT findings. He has no renal stones on renal US and normal GFR but his recent creatinine levels have been above normal ands some concern that this could be related to PHPT. He has seen a urologist for f/u.  A bone density study showed osteopenia in the left radius (-1.4). A 4-D CT scan of the neck confirmed a 1 cm left inferior parathyroid adenoma with typical contrast enhancement and washout characteristics and reviewed again today with Viktor Brooke and he feels strongly that the enhancement characteristics are highly supportive for a parathyroid adenoma.  Although he appears to be asymptomatic, based on his age under 50 years and also osteopenia in his forearm, he meets NIH consensus criteria for a MIP.  There were no significant thyroid nodules or other enlarged parathyroid glands on imaging. Genetic w/u was negative for MEN I or MEN II.  He denies fever, body aches, cough, cyanosis, chest burning, anosmia or recent known COVID exposures.  All family members at home are well. He is now vaccinated. He is using CPAP now x 4 years for OSAS.  He has GERD managed with Pepcid AC 20 mg BID.  This past June had pericardiac window for pericarditis, possibly related to the COVID vaccine.  He had a colonoscopy and removal of a benign polyp in November 2021. He also had an EGD for severe GERD and removal of a tubular adenoma.  Recent blood work last month revealed a serum calcium level of 10.8, phosphate 3.6, GFR of 64, creatinine 1.35, intact parathyroid hormone level of 18 and a normal vitamin D 25 OH total at 32.9. He had seen a Nephrologist and has stage 2 CKD. He is being monitored at this point in time but referred back by his Endocrinologist who is convinced he has primary hyperparathyroidism and has recommended parathyroid surgery.  He had repeat laboratory testing on 6/21/2022.  His serum calcium level remains elevated at 10.4 mg/DL but albumin elevated at 5.2G/DL and normal ionized calcium at 5.2.  Monoclonal antibodies were not detected.  Thyroid function is normal.  He has negative antithyroid antibodies.  Angiotensin-converting enzyme was normal at 28.  Vitamin D 25 OH total is borderline at 29.  A 24-hour urine collection for calcium was elevated at 366 mg per 24 hours. He has seen Dr. 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  His lab data this past October revealed a serum calcium level of 10.9 MG/DL and a nonsuppressed intact PTH of A 4D CT scan was reviewed and there is a likely left lower parathyroid adenoma.  He had a mild COVID infection in January but mild.  He had pericarditis in 2021 possibly related to a COVID vaccine and has not been boosted.

## 2023-02-15 NOTE — DATA REVIEWED
[de-identified] : see HPI [de-identified] : see HPI [de-identified] : see HPI: 4-D CT reviewed with Viktor Brooke (radiologist)

## 2023-02-15 NOTE — CONSULT LETTER
[Dear  ___] : Dear  [unfilled], [Consult Letter:] : I had the pleasure of evaluating your patient, [unfilled]. [Please see my note below.] : Please see my note below. [Consult Closing:] : Thank you very much for allowing me to participate in the care of this patient.  If you have any questions, please do not hesitate to contact me. [Sincerely,] : Sincerely, [DrMalia  ___] : Dr. ESTRELLA [DrMalia ___] : Dr. ESTRELLA [FreeTextEntry3] : \par Viktor Engle M.D., FACS, ECNU\par Director Center for Thyroid & Parathyroid Surgery at [facility]\par [facility] [place]\par Certified in Thyroid/Parathyroid/Neck Ultrasound, ECNU/ AIUM\par , Department of Otolaryngology\par [facility]\par  \par

## 2023-03-03 ENCOUNTER — APPOINTMENT (OUTPATIENT)
Dept: UROLOGY | Facility: CLINIC | Age: 48
End: 2023-03-03
Payer: COMMERCIAL

## 2023-03-03 VITALS — HEART RATE: 86 BPM | SYSTOLIC BLOOD PRESSURE: 109 MMHG | TEMPERATURE: 97.3 F | DIASTOLIC BLOOD PRESSURE: 71 MMHG

## 2023-03-03 DIAGNOSIS — G89.29 LEFT LOWER QUADRANT PAIN: ICD-10-CM

## 2023-03-03 DIAGNOSIS — R10.32 LEFT LOWER QUADRANT PAIN: ICD-10-CM

## 2023-03-03 PROCEDURE — 99213 OFFICE O/P EST LOW 20 MIN: CPT

## 2023-03-03 NOTE — HISTORY OF PRESENT ILLNESS
[FreeTextEntry1] : left lower quadrant pain\par few weeks ago\par 2-3/10 aching\par no nausea vomintgn\par no hematuria\par no dysuria\par PSA 0.3\par intermittentcy of flow\par

## 2023-03-06 LAB
APPEARANCE: CLEAR
BACTERIA UR CULT: NORMAL
BACTERIA: NEGATIVE
BILIRUBIN URINE: NEGATIVE
BLOOD URINE: NEGATIVE
COLOR: COLORLESS
GLUCOSE QUALITATIVE U: NEGATIVE
HYALINE CASTS: 0 /LPF
KETONES URINE: NEGATIVE
LEUKOCYTE ESTERASE URINE: NEGATIVE
MICROSCOPIC-UA: NORMAL
NITRITE URINE: NEGATIVE
PH URINE: 6.5
PROTEIN URINE: NEGATIVE
RED BLOOD CELLS URINE: 0 /HPF
SPECIFIC GRAVITY URINE: 1.01
SQUAMOUS EPITHELIAL CELLS: 0 /HPF
UROBILINOGEN URINE: NORMAL
WHITE BLOOD CELLS URINE: 0 /HPF

## 2023-04-04 ENCOUNTER — APPOINTMENT (OUTPATIENT)
Dept: OTOLARYNGOLOGY | Facility: CLINIC | Age: 48
End: 2023-04-04
Payer: COMMERCIAL

## 2023-04-04 VITALS
HEART RATE: 95 BPM | OXYGEN SATURATION: 97 % | HEIGHT: 69 IN | DIASTOLIC BLOOD PRESSURE: 81 MMHG | SYSTOLIC BLOOD PRESSURE: 123 MMHG | TEMPERATURE: 98 F | WEIGHT: 203 LBS | BODY MASS INDEX: 30.07 KG/M2

## 2023-04-04 PROCEDURE — 99215 OFFICE O/P EST HI 40 MIN: CPT | Mod: 25

## 2023-04-04 PROCEDURE — 31575 DIAGNOSTIC LARYNGOSCOPY: CPT

## 2023-04-04 NOTE — HISTORY OF PRESENT ILLNESS
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  His calcium level most recently has been in the high normal range with a normal but nonsuppressed iPTH and a low vitamin D 25-OH total (25.9).  He has normal renal function.  He has no history of nephrolithiasis. He is euthyroid and a recent thyroid ultrasound revealed bilateral subcentimeter colloid cysts and two potential candidates for enlarged parathyroid glands that were described as extra-thyroidal adjacent to the posterior/inferior thyroid lobes measuring 9 mm (RT) and 10 mm (LT).  There were no abnormal cervical lymph nodes identified.  Both of his parents have or had hypothyroidism.  There is no family history of thyroid cancer or hypercalcemia.  He has no known radiation exposures. He denies bone fractures, joint or bone pain, memory loss, depression, fatigue, muscle weakness, abdominal pain, nausea, constipation, but does have polyuria and polydipsia. Yifan denies recent shortness of breath, voice changes, dysphagia, anterior neck pain, neck pressure or mass. There is no family history of thyroid cancer. He denies any known radiation exposures in his youth. \par   [FreeTextEntry1] : Yifan returns to review blood results, bone density, a 4-D neck CT and ultrasound reports after Endocrine consultation to again consider a MIP.  There had been a question in the past of primary versus secondary hyperparathyroidism.  In 2014 he had both elevation of his iPTH as well as mild hypercalcemia (61.7 pg/ml/ 10.3 mg/dl) and hypercalciuria, initially confirming the diagnosis of primary hyperparathyroidism. His calcium and iPTH levels have fluctuated since then but he has had a non-suppressed PTH in association with an elevated serum calcium level and multiple elevated ionized calcium levels.  His last PTH was 23 pg/ml with a serum calcium of 10.3 mg/dl in February 2023.  He has had serum calcium levels as high as 11.3.  He has always had a normal but nonsuppressed PTH when the serum calcium was only slightly increased. All other causes for hypercalcemia have been eliminated including a normal SPEP, normal ACE, negative CaSR mutation profile, PTH related peptide <2.0, and no other good explanation for his biochemical results other than a parathyroid adenoma imaged on a good 4D CT study reviewed with Viktor Brooke today.  He may also have autoimmune thyroiditis based on neck CT findings. He has no renal stones on renal US and normal GFR but his recent creatinine levels have been above normal ands some concern that this could be related to PHPT. He has seen a urologist for f/u.  A bone density study showed osteopenia in the left radius (-1.4). A 4-D CT scan of the neck confirmed a 1 cm left inferior parathyroid adenoma with typical contrast enhancement and washout characteristics and reviewed again today with Viktor Brooke and he feels strongly that the enhancement characteristics are highly supportive for a parathyroid adenoma.  Although he appears to be asymptomatic, based on his age under 50 years and also osteopenia in his forearm, he meets NIH consensus criteria for a MIP.  There were no significant thyroid nodules or other enlarged parathyroid glands on imaging. Genetic w/u was negative for MEN I or MEN II.  He denies fever, body aches, cough, cyanosis, chest burning, anosmia or recent known COVID exposures.  All family members at home are well. He is now vaccinated. He is using CPAP now x 4 years for OSAS.  He has GERD managed with Pepcid AC 20 mg BID.  This past June had pericardiac window for pericarditis, possibly related to the COVID vaccine.  He had a colonoscopy and removal of a benign polyp in November 2021. He also had an EGD for severe GERD and removal of a tubular adenoma.  Recent blood work last month revealed a serum calcium level of 10.8, phosphate 3.6, GFR of 64, creatinine 1.35, intact parathyroid hormone level of 18 and a normal vitamin D 25 OH total at 32.9. He had seen a Nephrologist and has stage 2 CKD. He is being monitored at this point in time but referred back by his Endocrinologist who is convinced he has primary hyperparathyroidism and has recommended parathyroid surgery.  He had repeat laboratory testing on 6/21/2022.  His serum calcium level remains elevated at 10.4 mg/DL but albumin elevated at 5.2G/DL and normal ionized calcium at 5.2.  Monoclonal antibodies were not detected.  Thyroid function is normal.  He has negative antithyroid antibodies.  Angiotensin-converting enzyme was normal at 28.  Vitamin D 25 OH total is borderline at 29.  A 24-hour urine collection for calcium was elevated at 366 mg per 24 hours. He has seen Dr. 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  His lab data on 3/31/23 revealed a serum calcium level of 10.3 MG/DL and a nonsuppressed intact PTH of  29.9 pg/ml.  Vitamin D 25-OH total normal at 36.6.  A 4D CT scan was reviewed and there is a likely left lower parathyroid adenoma.  This was verified on office US last visit.   He had a mild COVID infection in January but mild.  He had pericarditis in 2021 possibly related to a COVID vaccine and has not been boosted. Today he mentioned again that there is a right lateral neck lymph node level VB that is worrisome to him and wants this removed at the time of his parathyroid surgery.

## 2023-04-04 NOTE — CONSULT LETTER
[Dear  ___] : Dear  [unfilled], [Consult Letter:] : I had the pleasure of evaluating your patient, [unfilled]. [Please see my note below.] : Please see my note below. [Consult Closing:] : Thank you very much for allowing me to participate in the care of this patient.  If you have any questions, please do not hesitate to contact me. [Sincerely,] : Sincerely, [DrMalia  ___] : Dr. ESTRELLA [DrMalia ___] : Dr. ESTRELLA [FreeTextEntry3] : \par Viktor Engle M.D., FACS, ECNU\par Director Center for Thyroid & Parathyroid Surgery at [facility]\par [facility] [place]\par Certified in Thyroid/Parathyroid/Neck Ultrasound, ECNU/ AIUM\par , Department of Otolaryngology\par [facility] [employer] [facility] at [facility]\par  \par

## 2023-04-04 NOTE — PROCEDURE
[Image(s) Captured] : image(s) captured and filed [Unable to Cooperate with Mirror] : patient unable to cooperate with mirror [Gag Reflex] : gag reflex preventing mirror examination [Topical Lidocaine] : topical lidocaine [Oxymetazoline HCl] : oxymetazoline HCl [Flexible Endoscope] : examined with the flexible endoscope [Serial Number: ___] : Serial Number: [unfilled] [FreeTextEntry3] : Procedure Note: \par [facility]\par THYROID/NECK ULTRASOUND REPORT\par \par NAME: DESIRAE CATHERINE.Malia... MR# [id number].....	 : 1975.....	 DATE: 2023..\par \par HISTORY/ INDICATIONS: A 47-year-old male with history of hypercalcemia and biochemical evidence for primary hyperparathyroidism to rule out growth of a previously imaged left inferior parathyroid adenoma and any additional thyroid disease.\par \par COMPARISON: Office study dated 2/15/2023.\par \par PROCEDURE: Physician performed high-resolution ultrasound gray scale imaging and color Doppler supplementation of the thyroid gland and neck was obtained in the longitudinal and transverse planes using a 13 MHz linear transducer with image capture. All measurements are in centimeters (longitudinal x AP x transverse). \par \par FINDINGS: Overall the thyroid gland is normal in size, heterogeneous in echotexture with normal vascularity on color Doppler flow. The study is limited to the imaging of the parathyroid glands.\par \par PARATHYROID GLANDS: Inferior and posterior to the left lower thyroid lobe is a oblong hypoechoic smoothly marginated structure  by an echogenic line with a well-defined vascular pedicle that measures 0.89 x 0.31 x 0.78 (previously 0.83 x 0.47 x 0.80 cm) compatible with a stable parathyroid adenoma. There are no other identified enlarged parathyroid glands in the central neck compartment. \par \par IMPRESSION: A 47-year-old male with primary hyperparathyroidism, a normal thyroid gland other than marked heterogeneity consistent with possible autoimmune thyroiditis and evidence for a left inferior parathyroid adenoma now measuring approximately 9 mm greatest dimension and stable. \par \par RECOMMENDATIONS: Repeat thyroid ultrasound in 1 year as well as continued monitoring of TSH, calcium and parathyroid hormone levels if parathyroidectomy is not under consideration.\par \par Electronically signed by referring, interpreting and reporting physician Viktor Engle MD on 2023, 3:50 PM.\par \par [facility]\par [address], [address] [place] [address]\par [phone] (voice), [phone] (fax).  [de-identified] : The nasal septum is minimally deviated to the right with a spur. The mucosa is pale and dry from use of CPAP. There are no masses or polyps and the nasal mucosa is normal.  The nasal mucus is thick but not purulent. The choanae and posterior nasopharynx are normal without masses or drainage. The Eustachian tube orifices appear patent. The pharynx, including the posterior and lateral pharyngeal walls, the vallecula and base of tongue are normal without ulcerations, lesions or masses. The hypopharynx including the pyriform sinuses open well without pooling of secretions, mucosal lesions or masses. The supraglottic larynx including the epiglottis, petiole, arytenoids, glossoepiglottic, aryepiglottic and pharyngoepiglottic folds are normal without mucosal lesions, ulcerations or masses. The glottis reveals normal false vocal folds.  The true vocal folds slightly hyperemic, and thickened but  tense and of equal length, without paralysis, having symmetric mobility on adduction and abduction. There are no mucosal lesions, nodules, cysts, erythroplasia or leukoplakia. The posterior cricoid area has healthy pink mucosa in the interarytenoid area and esophageal inlet. There is thickening/edema and tiger stripping of the interarytenoid mucosa suggestive of posterior laryngitis from laryngopharyngeal acid reflux disease. The trachea is clear without narrowing in the immediate subglottic region, without deviation or lesions.  [de-identified] : sleep apnea, parathyroid adenoma for preop assessment

## 2023-04-04 NOTE — DATA REVIEWED
[de-identified] : see HPI [de-identified] : see HPI [de-identified] : see HPI: 4-D CT reviewed with Viktor Brooke (radiologist)

## 2023-04-13 ENCOUNTER — TRANSCRIPTION ENCOUNTER (OUTPATIENT)
Age: 48
End: 2023-04-13

## 2023-04-13 VITALS
DIASTOLIC BLOOD PRESSURE: 77 MMHG | RESPIRATION RATE: 16 BRPM | HEIGHT: 69 IN | TEMPERATURE: 98 F | SYSTOLIC BLOOD PRESSURE: 119 MMHG | WEIGHT: 207.23 LBS | HEART RATE: 94 BPM | OXYGEN SATURATION: 98 %

## 2023-04-13 NOTE — ASU PATIENT PROFILE, ADULT - TEACHING/LEARNING RELIGIOUS CONSIDERATIONS
;pt nitro gtt has been increased to 15mg/min     for chest pain.     2000 pt still c/o chest pain. Nitro gtt now up to 20mcg/min. Order received for dilaudid. Iv. tropnin being run at this time. Stat ekg obtained. Continue to monitor   none

## 2023-04-13 NOTE — ASU PATIENT PROFILE, ADULT - NSICDXPASTSURGICALHX_GEN_ALL_CORE_FT
PAST SURGICAL HISTORY:  H/O adenoidectomy     History of appendectomy      PAST SURGICAL HISTORY:  H/O adenoidectomy     History of appendectomy 2012    History of surgery pericardic window

## 2023-04-13 NOTE — ASU PATIENT PROFILE, ADULT - NSICDXPASTMEDICALHX_GEN_ALL_CORE_FT
PAST MEDICAL HISTORY:  Anxiety and depression     CKD (chronic kidney disease)     HLD (hyperlipidemia)     Hyperparathyroidism     Mitral valve prolapse     Sleep apnea      PAST MEDICAL HISTORY:  CKD (chronic kidney disease)     GERD (gastroesophageal reflux disease)     H/O pericarditis     H/O Raynaud's syndrome toes    HLD (hyperlipidemia)     Hyperparathyroidism     Mitral valve prolapse     OCD (obsessive compulsive disorder)     Sleep apnea CPAP

## 2023-04-13 NOTE — ASU PATIENT PROFILE, ADULT - FALL HARM RISK - HARM RISK INTERVENTIONS

## 2023-04-13 NOTE — ASU PATIENT PROFILE, ADULT - TEACHING/LEARNING LEARNING PREFERENCES
I will START or STAY ON the medications listed below when I get home from the hospital:  None individual instruction/verbal instruction

## 2023-04-14 ENCOUNTER — TRANSCRIPTION ENCOUNTER (OUTPATIENT)
Age: 48
End: 2023-04-14

## 2023-04-14 ENCOUNTER — RESULT REVIEW (OUTPATIENT)
Age: 48
End: 2023-04-14

## 2023-04-14 ENCOUNTER — APPOINTMENT (OUTPATIENT)
Dept: OTOLARYNGOLOGY | Facility: HOSPITAL | Age: 48
End: 2023-04-14

## 2023-04-14 ENCOUNTER — OUTPATIENT (OUTPATIENT)
Dept: INPATIENT UNIT | Facility: HOSPITAL | Age: 48
LOS: 1 days | Discharge: HOME | End: 2023-04-14
Payer: COMMERCIAL

## 2023-04-14 VITALS
RESPIRATION RATE: 18 BRPM | DIASTOLIC BLOOD PRESSURE: 50 MMHG | SYSTOLIC BLOOD PRESSURE: 98 MMHG | OXYGEN SATURATION: 97 % | TEMPERATURE: 97 F | HEART RATE: 90 BPM

## 2023-04-14 DIAGNOSIS — Z90.89 ACQUIRED ABSENCE OF OTHER ORGANS: Chronic | ICD-10-CM

## 2023-04-14 DIAGNOSIS — Z98.890 OTHER SPECIFIED POSTPROCEDURAL STATES: Chronic | ICD-10-CM

## 2023-04-14 DIAGNOSIS — Z90.49 ACQUIRED ABSENCE OF OTHER SPECIFIED PARTS OF DIGESTIVE TRACT: Chronic | ICD-10-CM

## 2023-04-14 LAB
ALBUMIN SERPL ELPH-MCNC: 4.6 G/DL — SIGNIFICANT CHANGE UP (ref 3.3–5)
ALP SERPL-CCNC: 32 U/L — LOW (ref 40–120)
ALT FLD-CCNC: 23 U/L — SIGNIFICANT CHANGE UP (ref 10–45)
ANION GAP SERPL CALC-SCNC: 11 MMOL/L — SIGNIFICANT CHANGE UP (ref 5–17)
AST SERPL-CCNC: 26 U/L — SIGNIFICANT CHANGE UP (ref 10–40)
BILIRUB SERPL-MCNC: 0.5 MG/DL — SIGNIFICANT CHANGE UP (ref 0.2–1.2)
BUN SERPL-MCNC: 28 MG/DL — HIGH (ref 7–23)
CALCIUM SERPL-MCNC: 9.5 MG/DL — SIGNIFICANT CHANGE UP (ref 8.4–10.5)
CHLORIDE SERPL-SCNC: 103 MMOL/L — SIGNIFICANT CHANGE UP (ref 96–108)
CO2 SERPL-SCNC: 22 MMOL/L — SIGNIFICANT CHANGE UP (ref 22–31)
CREAT SERPL-MCNC: 1.62 MG/DL — HIGH (ref 0.5–1.3)
EGFR: 52 ML/MIN/1.73M2 — LOW
GLUCOSE SERPL-MCNC: 134 MG/DL — HIGH (ref 70–99)
HCT VFR BLD CALC: 42 % — SIGNIFICANT CHANGE UP (ref 39–50)
HGB BLD-MCNC: 13.7 G/DL — SIGNIFICANT CHANGE UP (ref 13–17)
MAGNESIUM SERPL-MCNC: 1.8 MG/DL — SIGNIFICANT CHANGE UP (ref 1.6–2.6)
MCHC RBC-ENTMCNC: 29.1 PG — SIGNIFICANT CHANGE UP (ref 27–34)
MCHC RBC-ENTMCNC: 32.6 GM/DL — SIGNIFICANT CHANGE UP (ref 32–36)
MCV RBC AUTO: 89.4 FL — SIGNIFICANT CHANGE UP (ref 80–100)
NRBC # BLD: 0 /100 WBCS — SIGNIFICANT CHANGE UP (ref 0–0)
PHOSPHATE SERPL-MCNC: 3.3 MG/DL — SIGNIFICANT CHANGE UP (ref 2.5–4.5)
PLATELET # BLD AUTO: 307 K/UL — SIGNIFICANT CHANGE UP (ref 150–400)
POTASSIUM SERPL-MCNC: 4.7 MMOL/L — SIGNIFICANT CHANGE UP (ref 3.5–5.3)
POTASSIUM SERPL-SCNC: 4.7 MMOL/L — SIGNIFICANT CHANGE UP (ref 3.5–5.3)
PROT SERPL-MCNC: 6.8 G/DL — SIGNIFICANT CHANGE UP (ref 6–8.3)
PTH-INTACT IO % DIF SERPL: 11.4 PG/ML — LOW (ref 15–65)
PTH-INTACT IO % DIF SERPL: 15.1 PG/ML — SIGNIFICANT CHANGE UP (ref 15–65)
PTH-INTACT IO % DIF SERPL: 21.6 PG/ML — SIGNIFICANT CHANGE UP (ref 15–65)
PTH-INTACT IO % DIF SERPL: 22.9 PG/ML — SIGNIFICANT CHANGE UP (ref 15–65)
PTH-INTACT IO % DIF SERPL: 23.5 PG/ML — SIGNIFICANT CHANGE UP (ref 15–65)
PTH-INTACT IO % DIF SERPL: 28.8 PG/ML — SIGNIFICANT CHANGE UP (ref 15–65)
PTH-INTACT IO % DIF SERPL: 33.2 PG/ML — SIGNIFICANT CHANGE UP (ref 15–65)
RBC # BLD: 4.7 M/UL — SIGNIFICANT CHANGE UP (ref 4.2–5.8)
RBC # FLD: 13.2 % — SIGNIFICANT CHANGE UP (ref 10.3–14.5)
SODIUM SERPL-SCNC: 136 MMOL/L — SIGNIFICANT CHANGE UP (ref 135–145)
WBC # BLD: 11.65 K/UL — HIGH (ref 3.8–10.5)
WBC # FLD AUTO: 11.65 K/UL — HIGH (ref 3.8–10.5)

## 2023-04-14 PROCEDURE — C1889: CPT

## 2023-04-14 PROCEDURE — 85027 COMPLETE CBC AUTOMATED: CPT

## 2023-04-14 PROCEDURE — 60500 EXPLORE PARATHYROID GLANDS: CPT | Mod: GC

## 2023-04-14 PROCEDURE — 80053 COMPREHEN METABOLIC PANEL: CPT

## 2023-04-14 PROCEDURE — 88305 TISSUE EXAM BY PATHOLOGIST: CPT

## 2023-04-14 PROCEDURE — 88305 TISSUE EXAM BY PATHOLOGIST: CPT | Mod: 26

## 2023-04-14 PROCEDURE — 36415 COLL VENOUS BLD VENIPUNCTURE: CPT

## 2023-04-14 PROCEDURE — 38510 BIOPSY/REMOVAL LYMPH NODES: CPT

## 2023-04-14 PROCEDURE — 60500 EXPLORE PARATHYROID GLANDS: CPT

## 2023-04-14 PROCEDURE — 38510 BIOPSY/REMOVAL LYMPH NODES: CPT | Mod: GC

## 2023-04-14 PROCEDURE — 83735 ASSAY OF MAGNESIUM: CPT

## 2023-04-14 PROCEDURE — 83970 ASSAY OF PARATHORMONE: CPT

## 2023-04-14 PROCEDURE — 84100 ASSAY OF PHOSPHORUS: CPT

## 2023-04-14 PROCEDURE — 88331 PATH CONSLTJ SURG 1 BLK 1SPC: CPT | Mod: 26

## 2023-04-14 PROCEDURE — 88331 PATH CONSLTJ SURG 1 BLK 1SPC: CPT

## 2023-04-14 DEVICE — CLIP APPLIER ETHICON LIGACLIP 11.5" MEDIUM: Type: IMPLANTABLE DEVICE | Site: RIGHT LEVEL V LYMPH NODE | Status: FUNCTIONAL

## 2023-04-14 DEVICE — CLIP APPLIER ETHICON LIGACLIP 9 3/8" SMALL: Type: IMPLANTABLE DEVICE | Site: RIGHT LEVEL V LYMPH NODE | Status: FUNCTIONAL

## 2023-04-14 RX ORDER — FAMOTIDINE 10 MG/ML
1 INJECTION INTRAVENOUS
Refills: 0 | DISCHARGE

## 2023-04-14 RX ORDER — ATORVASTATIN CALCIUM 80 MG/1
1 TABLET, FILM COATED ORAL
Refills: 0 | DISCHARGE

## 2023-04-14 RX ORDER — HYDROMORPHONE HYDROCHLORIDE 2 MG/ML
0.5 INJECTION INTRAMUSCULAR; INTRAVENOUS; SUBCUTANEOUS
Refills: 0 | Status: DISCONTINUED | OUTPATIENT
Start: 2023-04-14 | End: 2023-04-14

## 2023-04-14 RX ORDER — ASPIRIN/CALCIUM CARB/MAGNESIUM 324 MG
0 TABLET ORAL
Refills: 0 | DISCHARGE

## 2023-04-14 NOTE — BRIEF OPERATIVE NOTE - NSICDXBRIEFPREOP_GEN_ALL_CORE_FT
PRE-OP DIAGNOSIS:  Parathyroid adenoma 14-Apr-2023 11:14:23  Jessica Clark  Enlarged lymph node in neck 14-Apr-2023 11:14:30  Jessica Clark

## 2023-04-14 NOTE — BRIEF OPERATIVE NOTE - NSICDXBRIEFPOSTOP_GEN_ALL_CORE_FT
POST-OP DIAGNOSIS:  Parathyroid adenoma 14-Apr-2023 11:14:48  Jessica Clark  Enlarged lymph node in neck 14-Apr-2023 11:14:57  Jessica Clark

## 2023-04-14 NOTE — ASU DISCHARGE PLAN (ADULT/PEDIATRIC) - MEDICATION INSTRUCTIONS
Please take 1000mg acetaminophen (tylenol) every 6 hours as needed. Do not exceed 4000 mg daily. You can resume your home aspirin 81 mg on Monday.

## 2023-04-14 NOTE — ASU DISCHARGE PLAN (ADULT/PEDIATRIC) - CARE PROVIDER_API CALL
Viktor Engle)  Otolaryngology  [address], [address], [place] [address]  Phone: [phone]  Fax: [phone]  Follow Up Time: 1 week

## 2023-04-14 NOTE — PRE-ANESTHESIA EVALUATION ADULT - NSPROPOSEDPROCEDFT_GEN_ALL_CORE
Parathyroidectomy, total thyroid lobectomy, right neck dissection, parathyroid autotransplant, thymectomy

## 2023-04-14 NOTE — ASU DISCHARGE PLAN (ADULT/PEDIATRIC) - NS MD DC FALL RISK RISK
For information on Fall & Injury Prevention, visit: [email or web address] OR  [email or web address] OR  https://www.cdc.gov/steadi/patient.html

## 2023-04-14 NOTE — BRIEF OPERATIVE NOTE - SPECIMENS
L inferior parathyroid (permanent), L superior parathyroid (frozen), R spinal accessory lymph node (permanent)

## 2023-04-14 NOTE — CHART NOTE - NSCHARTNOTEFT_GEN_A_CORE
s/p L inferior parathyroidectomy and R spinal accessory node removal  Dr. Engle    S: Pt has no complaints. Denies CP, SOB, SANDRA, calf tenderness. Pain controlled with medication. Tolerating some CLD.    O:  T(C): 35.8 (04-14-23 @ 10:57), Max: 35.8 (04-14-23 @ 10:57)  T(F): 96.5 (04-14-23 @ 10:57), Max: 96.5 (04-14-23 @ 10:57)  HR: 92 (04-14-23 @ 12:05) (92 - 108)  BP: 95/54 (04-14-23 @ 11:57) (91/52 - 116/57)  RR: 15 (04-14-23 @ 12:05) (15 - 22)  SpO2: 97% (04-14-23 @ 12:05) (93% - 99%)  Wt(kg): --            Gen: NAD, resting comfortably in bed  HEENT: trachea midline, neck soft and supple, no edema, crepitus or erythema, dressing in place, drain with minimal serosanguinous output, no paresthesias   C/V: NSR  Pulm: Nonlabored breathing, no respiratory distress  Abd: soft, NT/ND  Extrem: WWP, no calf edema, SCDs in place      47M PMH TYLER (CPAP at home), CKD II, osteopenia, PSHx pericardial window for pericarditis, appendectomy, presents with known b/l colloid cysts of thyroid, hypercalcemia with past question of primary vs secondary hyperparathyroidism s/p L inferior parathyroidectomy and R spinal accessory node removal.     ADAT  pending 2p labs  DVT ppx  pain/nausea management

## 2023-04-14 NOTE — BRIEF OPERATIVE NOTE - OPERATION/FINDINGS
Anesthesia with nerve monitoring ETT induced. Transverse incision was made approximately 2 fingerbreadths above the suprasternal notch in skin fold along Langers line. Subplatysmal skin flaps raised using electrocautery in a bloodless plane. Midline strap muscle raphe divided with electrocautery. Thyroid capsule  from the overlying strap muscles using bipolar cautery. Left inferior parathyroid adenoma identified and dissected around. Vascular pedicle clipped. Parathyroid adenoma remove. Recurrent laryngeal nerve identified using nerve monitor and preserved. Vagus nerve in carotid sheath identified & preserved. Viable parathyroid glands identified. No bleeding on Valsalva maneuver. VENKATESH drain left beneath strap muscles prior to their reapproximation. Neck incision closed primarily. Incision over R spinal accessory terrirtory lymph node. Incision along Skyla's lines. Dissected down around node, using nerve stimulator to monitor spinal accessory nerve function. Closed platysma with 5-0 vicryl, skin with 5-0 prolene running subcuticular.

## 2023-04-14 NOTE — BRIEF OPERATIVE NOTE - NSICDXBRIEFPROCEDURE_GEN_ALL_CORE_FT
PROCEDURES:  Parathyroidectomy 14-Apr-2023 10:48:55 left inferior Jessica Clark   PROCEDURES:  Parathyroidectomy 14-Apr-2023 10:48:55 left inferior Jessica Clark  Lymph node excision 14-Apr-2023 11:14:05 level V Jessica Clark

## 2023-04-18 LAB — SURGICAL PATHOLOGY STUDY: SIGNIFICANT CHANGE UP

## 2023-04-20 ENCOUNTER — APPOINTMENT (OUTPATIENT)
Dept: OTOLARYNGOLOGY | Facility: CLINIC | Age: 48
End: 2023-04-20
Payer: COMMERCIAL

## 2023-04-20 VITALS
OXYGEN SATURATION: 100 % | DIASTOLIC BLOOD PRESSURE: 67 MMHG | TEMPERATURE: 98 F | HEART RATE: 75 BPM | SYSTOLIC BLOOD PRESSURE: 107 MMHG | RESPIRATION RATE: 14 BRPM

## 2023-04-20 DIAGNOSIS — R49.9 UNSPECIFIED VOICE AND RESONANCE DISORDER: ICD-10-CM

## 2023-04-20 PROCEDURE — 31575 DIAGNOSTIC LARYNGOSCOPY: CPT | Mod: 58

## 2023-04-20 PROCEDURE — 99024 POSTOP FOLLOW-UP VISIT: CPT

## 2023-04-20 NOTE — PHYSICAL EXAM
[Normal] : parotid gland, submandibular gland and thyroid glands are normal [de-identified] : The neck is flat without seroma or hematoma. The subcuticular suture was removed.  The suture from the right neck lymph node biopsy was removed as well.  The voice is raspy.  A Chvostek sign was not present.

## 2023-04-20 NOTE — HISTORY OF PRESENT ILLNESS
[de-identified] : Yifan is a generally healthy 43-year-old male  in the [employer] industry who was first noted 4-5 years ago to have mild hypercalcemia and had been monitored for this.  His calcium level most recently has been in the high normal range with a normal but nonsuppressed iPTH and a low vitamin D 25-OH total (25.9).  He has normal renal function.  He has no history of nephrolithiasis. He is euthyroid and a recent thyroid ultrasound revealed bilateral subcentimeter colloid cysts and two potential candidates for enlarged parathyroid glands that were described as extra-thyroidal adjacent to the posterior/inferior thyroid lobes measuring 9 mm (RT) and 10 mm (LT).  There were no abnormal cervical lymph nodes identified.  Both of his parents have or had hypothyroidism.  There is no family history of thyroid cancer or hypercalcemia.  He has no known radiation exposures. He denies bone fractures, joint or bone pain, memory loss, depression, fatigue, muscle weakness, abdominal pain, nausea, constipation, but does have polyuria and polydipsia. Yifan denies recent shortness of breath, voice changes, dysphagia, anterior neck pain, neck pressure or mass. There is no family history of thyroid cancer. He denies any known radiation exposures in his youth. \par   [FreeTextEntry1] : Yifan returns for a first postoperative visit after an uneventful parathyroidectomy on 4/14/2023.  He had an approximately 1.1 cm left inferior parathyroid adenoma close to the lower pole of the left thyroid lobe.   A normal left upper parathyroid gland was identified and a small micro biopsy was taken, confirmed as normocellular parathyroid tissue on frozen section.  The recurrent laryngeal nerve was functionally intact to nerve stimulation at the end of the procedure.   Intraoperative parathyroid hormone dropped from a preinduction baseline of 33.2 down to 11.4 pg/mL after multiple determinations approximately 40 minutes after excision of this gland with an initial rise likely related to manipulation of the adenoma.   A right level V lymph node was adherent to the spinal accessory nerve, but there was no evidence of malignancy.  He offers no specific complaints and denies paresthesias today.  He did have slight tingling in his toes the other day but this is resolved.  He is eating a regular diet.  His voice is minimally hoarse.

## 2023-04-20 NOTE — CONSULT LETTER
[Dear  ___] : Dear  [unfilled], [Consult Letter:] : I had the pleasure of evaluating your patient, [unfilled]. [Please see my note below.] : Please see my note below. [Consult Closing:] : Thank you very much for allowing me to participate in the care of this patient.  If you have any questions, please do not hesitate to contact me. [Sincerely,] : Sincerely, [FreeTextEntry3] : \par Viktor Engle M.D., FACS, ECNU\par Director Center for Thyroid & Parathyroid Surgery at [facility]\par [facility]\par Certified in Thyroid/Parathyroid/Neck Ultrasound, ECNU/ AIUM\par , Department of Otolaryngology\par [facility] [employer] [facility]\par  \par   [DrMalia  ___] : Dr. ESTRELLA [DrMalia ___] : Dr. ESTRELLA

## 2023-04-20 NOTE — PROCEDURE
[Image(s) Captured] : image(s) captured and filed [Unable to Cooperate with Mirror] : patient unable to cooperate with mirror [Gag Reflex] : gag reflex preventing mirror examination [Hoarseness] : hoarseness not clearly evaluated by indirect laryngoscopy [Topical Lidocaine] : topical lidocaine [Oxymetazoline HCl] : oxymetazoline HCl [Flexible Endoscope] : examined with the flexible endoscope [Serial Number: ___] : Serial Number: [unfilled] [de-identified] : sleep apnea, parathyroid adenoma for postop assessment [de-identified] : The nasal septum is minimally deviated to the right with a spur. The mucosa is pale and dry from use of CPAP. There are no masses or polyps and the nasal mucosa is normal.  The nasal mucus is thick but not purulent. The choanae and posterior nasopharynx are normal without masses or drainage. The Eustachian tube orifices appear patent. The pharynx, including the posterior and lateral pharyngeal walls, the vallecula and base of tongue are normal without ulcerations, lesions or masses. The hypopharynx including the pyriform sinuses open well without pooling of secretions, mucosal lesions or masses. The supraglottic larynx including the epiglottis, petiole, arytenoids, glossoepiglottic, aryepiglottic and pharyngoepiglottic folds are normal without mucosal lesions, ulcerations or masses. The glottis reveals normal false vocal folds.  The true vocal folds are slightly hyperemic, and thickened but  tense and of equal length, without paralysis, having symmetric mobility on adduction and abduction. There are no mucosal lesions, nodules, cysts, erythroplasia or leukoplakia. The posterior cricoid area has healthy pink mucosa in the interarytenoid area and esophageal inlet. There is thickening/edema and tiger stripping of the interarytenoid mucosa suggestive of posterior laryngitis from laryngopharyngeal acid reflux disease. The trachea is clear without narrowing in the immediate subglottic region, without deviation or lesions.

## 2023-04-20 NOTE — REASON FOR VISIT
[FreeTextEntry2] : a first postoperative visit following parathyroidectomy incision of cervical lymph node. [FreeTextEntry1] : Stephanie Amezcua MD, PCP, Anders Dela Cruz MD Cardiologist,  Ethel Domingo MD  Endocrinologist, Kyra Barajas MD, Donnell Amezcua MD Hematologist

## 2023-04-21 PROBLEM — Z86.79 PERSONAL HISTORY OF OTHER DISEASES OF THE CIRCULATORY SYSTEM: Chronic | Status: ACTIVE | Noted: 2023-04-13

## 2023-04-21 PROBLEM — F42.9 OBSESSIVE-COMPULSIVE DISORDER, UNSPECIFIED: Chronic | Status: ACTIVE | Noted: 2023-04-13

## 2023-04-21 PROBLEM — K21.9 GASTRO-ESOPHAGEAL REFLUX DISEASE WITHOUT ESOPHAGITIS: Chronic | Status: ACTIVE | Noted: 2023-04-13

## 2023-04-21 PROBLEM — G47.30 SLEEP APNEA, UNSPECIFIED: Chronic | Status: ACTIVE | Noted: 2018-12-18

## 2023-04-24 LAB
ALBUMIN SERPL ELPH-MCNC: 4.9 G/DL
ALP BLD-CCNC: 36 U/L
ALT SERPL-CCNC: 20 U/L
ANION GAP SERPL CALC-SCNC: 11 MMOL/L
AST SERPL-CCNC: 22 U/L
BILIRUB SERPL-MCNC: 0.8 MG/DL
BUN SERPL-MCNC: 17 MG/DL
CALCIUM SERPL-MCNC: 10.5 MG/DL
CHLORIDE SERPL-SCNC: 99 MMOL/L
CO2 SERPL-SCNC: 27 MMOL/L
CREAT SERPL-MCNC: 1.61 MG/DL
EGFR: 52 ML/MIN/1.73M2
GLUCOSE SERPL-MCNC: 91 MG/DL
PHOSPHATE SERPL-MCNC: 3.2 MG/DL
POTASSIUM SERPL-SCNC: 4.7 MMOL/L
PROT SERPL-MCNC: 7.3 G/DL
SODIUM SERPL-SCNC: 137 MMOL/L

## 2023-06-08 ENCOUNTER — APPOINTMENT (OUTPATIENT)
Dept: OTOLARYNGOLOGY | Facility: CLINIC | Age: 48
End: 2023-06-08
Payer: COMMERCIAL

## 2023-06-08 ENCOUNTER — LABORATORY RESULT (OUTPATIENT)
Age: 48
End: 2023-06-08

## 2023-06-08 VITALS
BODY MASS INDEX: 30.07 KG/M2 | TEMPERATURE: 97.3 F | DIASTOLIC BLOOD PRESSURE: 72 MMHG | OXYGEN SATURATION: 96 % | HEIGHT: 69 IN | SYSTOLIC BLOOD PRESSURE: 113 MMHG | WEIGHT: 203 LBS | HEART RATE: 103 BPM

## 2023-06-08 DIAGNOSIS — R82.994 HYPERCALCIURIA: ICD-10-CM

## 2023-06-08 PROCEDURE — 99024 POSTOP FOLLOW-UP VISIT: CPT

## 2023-06-08 NOTE — CONSULT LETTER
[Dear  ___] : Dear  [unfilled], [Consult Letter:] : I had the pleasure of evaluating your patient, [unfilled]. [Please see my note below.] : Please see my note below. [Consult Closing:] : Thank you very much for allowing me to participate in the care of this patient.  If you have any questions, please do not hesitate to contact me. [Sincerely,] : Sincerely, [DrMalia  ___] : Dr. ESTRELLA [DrMalia ___] : Dr. ESTRELLA [FreeTextEntry3] : \par Viktor Engle M.D., FACS, ECNU\par Director Center for [facility]\par [facility] [place]\par Certified in Thyroid/Parathyroid/Neck Ultrasound, ECNU/ AIUM\par , Department of Otolaryngology\par [facility]\par  \par

## 2023-06-08 NOTE — REASON FOR VISIT
[FreeTextEntry2] : a second postoperative visit following parathyroidectomy incision of cervical lymph node. [FreeTextEntry1] : Stephanie Amezcua MD, PCP, Anders Dela Cruz MD Cardiologist,  Ethel Domingo MD  Endocrinologist, Kyra Barajas MD, Donnell Amezcua MD Hematologist

## 2023-06-08 NOTE — HISTORY OF PRESENT ILLNESS
[de-identified] : Yifan is a generally healthy 43-year-old male  in the [employer] industry who was first noted 4-5 years ago to have mild hypercalcemia and had been monitored for this.  His calcium level most recently has been in the high normal range with a normal but nonsuppressed iPTH and a low vitamin D 25-OH total (25.9).  He has normal renal function.  He has no history of nephrolithiasis. He is euthyroid and a recent thyroid ultrasound revealed bilateral subcentimeter colloid cysts and two potential candidates for enlarged parathyroid glands that were described as extra-thyroidal adjacent to the posterior/inferior thyroid lobes measuring 9 mm (RT) and 10 mm (LT).  There were no abnormal cervical lymph nodes identified.  Both of his parents have or had hypothyroidism.  There is no family history of thyroid cancer or hypercalcemia.  He has no known radiation exposures. He denies bone fractures, joint or bone pain, memory loss, depression, fatigue, muscle weakness, abdominal pain, nausea, constipation, but does have polyuria and polydipsia. Yifan denies recent shortness of breath, voice changes, dysphagia, anterior neck pain, neck pressure or mass. There is no family history of thyroid cancer. He denies any known radiation exposures in his youth. \par   [FreeTextEntry1] : Yifan returns for a 2nd  postoperative visit after an uneventful parathyroidectomy on 4/14/2023.  He had an approximately 1.1 cm left inferior parathyroid adenoma close to the lower pole of the left thyroid lobe.   A normal left upper parathyroid gland was identified and a small micro biopsy was taken, confirmed as normocellular parathyroid tissue on frozen section.  The recurrent laryngeal nerve was functionally intact to nerve stimulation at the end of the procedure.   Intraoperative parathyroid hormone dropped from a preinduction baseline of 33.2 down to 11.4 pg/mL after multiple determinations approximately 40 minutes after excision of this gland with an initial rise likely related to manipulation of the adenoma.   A right level V lymph node was adherent to the spinal accessory nerve, but there was no evidence of malignancy.  He offers no specific complaints and denies paresthesias today.  He did have slight tingling in his toes post op that resolved.  He is eating a regular diet.  His voice is improved. His last serum calcium last month (May) was again slightly elevated to 10.6 mg/dl but iPTH suppressed at 15 pg/ml. He was advised to stop calcium intake and will repeat labs today. He has not had any calcium supplements since the end of April and is still taking vitamin D3 3000 IU daily.

## 2023-06-12 LAB
25(OH)D3 SERPL-MCNC: 38 NG/ML
ALBUMIN SERPL ELPH-MCNC: 5.2 G/DL
ALP BLD-CCNC: 35 U/L
ALT SERPL-CCNC: 27 U/L
ANION GAP SERPL CALC-SCNC: 16 MMOL/L
AST SERPL-CCNC: 25 U/L
BILIRUB SERPL-MCNC: 0.6 MG/DL
BUN SERPL-MCNC: 16 MG/DL
CA-I SERPL-SCNC: 5.1 MG/DL
CALCIUM SERPL-MCNC: 10.3 MG/DL
CALCIUM SERPL-MCNC: 10.3 MG/DL
CHLORIDE SERPL-SCNC: 101 MMOL/L
CO2 SERPL-SCNC: 22 MMOL/L
CREAT SERPL-MCNC: 1.45 MG/DL
EGFR: 59 ML/MIN/1.73M2
GLUCOSE SERPL-MCNC: 94 MG/DL
MAGNESIUM SERPL-MCNC: 1.9 MG/DL
PARATHYROID HORMONE INTACT: 14 PG/ML
POTASSIUM SERPL-SCNC: 4.6 MMOL/L
PROT SERPL-MCNC: 7.2 G/DL
SODIUM SERPL-SCNC: 140 MMOL/L

## 2023-07-20 ENCOUNTER — APPOINTMENT (OUTPATIENT)
Dept: UROLOGY | Facility: CLINIC | Age: 48
End: 2023-07-20
Payer: COMMERCIAL

## 2023-07-20 VITALS — DIASTOLIC BLOOD PRESSURE: 73 MMHG | TEMPERATURE: 97.9 F | HEART RATE: 84 BPM | SYSTOLIC BLOOD PRESSURE: 114 MMHG

## 2023-07-20 DIAGNOSIS — Z00.00 ENCOUNTER FOR GENERAL ADULT MEDICAL EXAMINATION W/OUT ABNORMAL FINDINGS: ICD-10-CM

## 2023-07-20 PROCEDURE — 99212 OFFICE O/P EST SF 10 MIN: CPT

## 2023-07-20 NOTE — HISTORY OF PRESENT ILLNESS
[FreeTextEntry1] : 48M here for new penile discomfort\par \par - patient reports that Sunday he began feeling a pinching sensation along the corona of the glans. He reports it to be intermittent, non reproducible on palpation, with normal urinary function. Patient has stated that the discomfort has been abating over the last few days. \par - denies dysuria, discharge, hematuria, skin changes, fever, chill,s flank pain \par \par Last PSA: 12/22 0.29

## 2023-07-20 NOTE — PHYSICAL EXAM
[Normal Appearance] : normal appearance [General Appearance - In No Acute Distress] : no acute distress [Abdomen Soft] : soft [Abdomen Tenderness] : non-tender [Abdomen Hernia] : no hernia was discovered [Urethral Meatus] : meatus normal [Penis Abnormality] : normal circumcised penis [Scrotum] : the scrotum was normal [Testes Tenderness] : no tenderness of the testes [Testes Mass (___cm)] : there were no testicular masses [Heart Rate And Rhythm] : Heart rate and rhythm were normal [Edema] : no peripheral edema [] : no respiratory distress [Respiration, Rhythm And Depth] : normal respiratory rhythm and effort [Exaggerated Use Of Accessory Muscles For Inspiration] : no accessory muscle use [Oriented To Time, Place, And Person] : oriented to person, place, and time [Affect] : the affect was normal [Normal Station and Gait] : the gait and station were normal for the patient's age [FreeTextEntry1] : no erythema, no scaling, no discharge, no lesion observed

## 2023-07-20 NOTE — ASSESSMENT
[FreeTextEntry1] : 48M here for penile discomfort\par \par - patient reassured, no lesions or abnormalities observed\par - UA, UCx\par - to follow as scheduled for annual.

## 2023-07-24 LAB
APPEARANCE: CLEAR
BACTERIA UR CULT: NORMAL
BACTERIA: NEGATIVE /HPF
BILIRUBIN URINE: NEGATIVE
BLOOD URINE: NEGATIVE
CAST: 0 /LPF
COLOR: YELLOW
EPITHELIAL CELLS: 0 /HPF
GLUCOSE QUALITATIVE U: NEGATIVE MG/DL
KETONES URINE: NEGATIVE MG/DL
LEUKOCYTE ESTERASE URINE: ABNORMAL
MICROSCOPIC-UA: NORMAL
NITRITE URINE: NEGATIVE
PH URINE: 6.5
PROTEIN URINE: NEGATIVE MG/DL
RED BLOOD CELLS URINE: 0 /HPF
SPECIFIC GRAVITY URINE: 1.01
UROBILINOGEN URINE: 0.2 MG/DL
WHITE BLOOD CELLS URINE: 0 /HPF

## 2023-09-14 ENCOUNTER — APPOINTMENT (OUTPATIENT)
Dept: OTOLARYNGOLOGY | Facility: CLINIC | Age: 48
End: 2023-09-14
Payer: COMMERCIAL

## 2023-09-14 VITALS
DIASTOLIC BLOOD PRESSURE: 72 MMHG | HEART RATE: 95 BPM | SYSTOLIC BLOOD PRESSURE: 111 MMHG | HEIGHT: 69 IN | WEIGHT: 194 LBS | OXYGEN SATURATION: 99 % | BODY MASS INDEX: 28.73 KG/M2 | TEMPERATURE: 98 F

## 2023-09-14 PROCEDURE — 99214 OFFICE O/P EST MOD 30 MIN: CPT | Mod: 25

## 2023-09-14 RX ORDER — ACETAMINOPHEN AND CODEINE 300; 30 MG/1; MG/1
300-30 TABLET ORAL
Qty: 15 | Refills: 0 | Status: COMPLETED | COMMUNITY
Start: 2023-04-13 | End: 2023-09-14

## 2023-10-30 NOTE — ADDENDUM
[FreeTextEntry1] : I, Stevenson Sánchez, acted solely as a scribe for Dr. Dorian Wills on this date. 10/01/2019.  No

## 2023-11-17 ENCOUNTER — NON-APPOINTMENT (OUTPATIENT)
Age: 48
End: 2023-11-17

## 2023-11-21 ENCOUNTER — APPOINTMENT (OUTPATIENT)
Dept: OTOLARYNGOLOGY | Facility: CLINIC | Age: 48
End: 2023-11-21
Payer: COMMERCIAL

## 2023-11-21 VITALS
HEART RATE: 83 BPM | TEMPERATURE: 97.9 F | HEIGHT: 69 IN | DIASTOLIC BLOOD PRESSURE: 72 MMHG | WEIGHT: 195.4 LBS | OXYGEN SATURATION: 97 % | BODY MASS INDEX: 28.94 KG/M2 | SYSTOLIC BLOOD PRESSURE: 115 MMHG

## 2023-11-21 DIAGNOSIS — N25.81 SECONDARY HYPERPARATHYROIDISM OF RENAL ORIGIN: ICD-10-CM

## 2023-11-21 DIAGNOSIS — J31.0 CHRONIC RHINITIS: ICD-10-CM

## 2023-11-21 DIAGNOSIS — G47.33 OBSTRUCTIVE SLEEP APNEA (ADULT) (PEDIATRIC): ICD-10-CM

## 2023-11-21 DIAGNOSIS — E55.9 VITAMIN D DEFICIENCY, UNSPECIFIED: ICD-10-CM

## 2023-11-21 PROCEDURE — 99214 OFFICE O/P EST MOD 30 MIN: CPT | Mod: 25

## 2023-11-21 PROCEDURE — 35901 EXCISION GRAFT NECK: CPT

## 2023-11-21 PROCEDURE — 31575 DIAGNOSTIC LARYNGOSCOPY: CPT

## 2023-11-22 LAB
25(OH)D3 SERPL-MCNC: 42.8 NG/ML
ALBUMIN SERPL ELPH-MCNC: 5.3 G/DL
ALP BLD-CCNC: 37 U/L
ALT SERPL-CCNC: 19 U/L
ANION GAP SERPL CALC-SCNC: 13 MMOL/L
AST SERPL-CCNC: 21 U/L
BILIRUB SERPL-MCNC: 0.7 MG/DL
BUN SERPL-MCNC: 12 MG/DL
CALCIUM SERPL-MCNC: 10.1 MG/DL
CALCIUM SERPL-MCNC: 10.4 MG/DL
CHLORIDE SERPL-SCNC: 102 MMOL/L
CO2 SERPL-SCNC: 25 MMOL/L
CREAT SERPL-MCNC: 1.49 MG/DL
EGFR: 57 ML/MIN/1.73M2
GLUCOSE SERPL-MCNC: 88 MG/DL
PARATHYROID HORMONE INTACT: 15 PG/ML
POTASSIUM SERPL-SCNC: 4.5 MMOL/L
PROT SERPL-MCNC: 7.4 G/DL
SODIUM SERPL-SCNC: 140 MMOL/L

## 2023-11-24 LAB — CA-I SERPL-SCNC: 5.1 MG/DL

## 2023-12-29 ENCOUNTER — APPOINTMENT (OUTPATIENT)
Dept: UROLOGY | Facility: CLINIC | Age: 48
End: 2023-12-29
Payer: COMMERCIAL

## 2023-12-29 ENCOUNTER — LABORATORY RESULT (OUTPATIENT)
Age: 48
End: 2023-12-29

## 2023-12-29 VITALS
BODY MASS INDEX: 28.88 KG/M2 | DIASTOLIC BLOOD PRESSURE: 68 MMHG | TEMPERATURE: 98.2 F | SYSTOLIC BLOOD PRESSURE: 115 MMHG | HEIGHT: 69 IN | HEART RATE: 103 BPM | WEIGHT: 195 LBS

## 2023-12-29 DIAGNOSIS — N40.1 BENIGN PROSTATIC HYPERPLASIA WITH LOWER URINARY TRACT SYMPMS: ICD-10-CM

## 2023-12-29 DIAGNOSIS — R35.1 BENIGN PROSTATIC HYPERPLASIA WITH LOWER URINARY TRACT SYMPMS: ICD-10-CM

## 2023-12-29 PROCEDURE — 99213 OFFICE O/P EST LOW 20 MIN: CPT

## 2024-01-02 LAB
ALBUMIN SERPL ELPH-MCNC: 5.3 G/DL
ALP BLD-CCNC: 40 U/L
ALT SERPL-CCNC: 24 U/L
ANION GAP SERPL CALC-SCNC: 14 MMOL/L
APPEARANCE: CLEAR
AST SERPL-CCNC: 24 U/L
BACTERIA UR CULT: NORMAL
BILIRUB SERPL-MCNC: 0.5 MG/DL
BILIRUBIN URINE: NEGATIVE
BLOOD URINE: NEGATIVE
BUN SERPL-MCNC: 16 MG/DL
CALCIUM SERPL-MCNC: 10 MG/DL
CHLORIDE SERPL-SCNC: 99 MMOL/L
CO2 SERPL-SCNC: 23 MMOL/L
COLOR: YELLOW
CREAT SERPL-MCNC: 1.45 MG/DL
EGFR: 59 ML/MIN/1.73M2
GLUCOSE QUALITATIVE U: NEGATIVE MG/DL
GLUCOSE SERPL-MCNC: 83 MG/DL
KETONES URINE: NEGATIVE MG/DL
LEUKOCYTE ESTERASE URINE: ABNORMAL
NITRITE URINE: NEGATIVE
PH URINE: 6.5
POTASSIUM SERPL-SCNC: 4.5 MMOL/L
PROT SERPL-MCNC: 7.4 G/DL
PROTEIN URINE: NEGATIVE MG/DL
PSA SERPL-MCNC: 0.35 NG/ML
SODIUM SERPL-SCNC: 136 MMOL/L
SPECIFIC GRAVITY URINE: 1.01
UROBILINOGEN URINE: 0.2 MG/DL

## 2024-01-02 NOTE — HISTORY OF PRESENT ILLNESS
[FreeTextEntry1] : DESIRAE CATHERINE is a 48 year M PMH: thyroiditis, hyperparathyroidism s/p resection, TYLER, BPH presenting on 12/29/2023  Yearly checkup. No urinary concerns. No hematuria, no dysuria. Weaning of clomipramine (psych med that previously affected urination, now better FOS). Denies retention. Nocturia x1.  No ED concerns.  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  Labs: PSA:  12/22 0.29

## 2024-01-02 NOTE — PHYSICAL EXAM
[Normal Appearance] : normal appearance [Well Groomed] : well groomed [General Appearance - In No Acute Distress] : no acute distress [Respiration, Rhythm And Depth] : normal respiratory rhythm and effort [Exaggerated Use Of Accessory Muscles For Inspiration] : no accessory muscle use [Costovertebral Angle Tenderness] : no ~M costovertebral angle tenderness [Urethral Meatus] : meatus normal [Penis Abnormality] : normal circumcised penis [Scrotum] : the scrotum was normal [Testes Tenderness] : no tenderness of the testes [Testes Mass (___cm)] : there were no testicular masses [Prostate Tenderness] : the prostate was not tender [No Prostate Nodules] : no prostate nodules [Prostate Size ___ gm] : prostate size [unfilled] gm [Normal Station and Gait] : the gait and station were normal for the patient's age [] : no rash [No Focal Deficits] : no focal deficits [Oriented To Time, Place, And Person] : oriented to person, place, and time [Affect] : the affect was normal [Mood] : the mood was normal [de-identified] : B/L 20cc testis, no varicocele. No prostate nodules

## 2024-01-03 ENCOUNTER — APPOINTMENT (OUTPATIENT)
Dept: ENDOCRINOLOGY | Facility: CLINIC | Age: 49
End: 2024-01-03
Payer: COMMERCIAL

## 2024-01-03 VITALS
HEART RATE: 97 BPM | BODY MASS INDEX: 29.77 KG/M2 | DIASTOLIC BLOOD PRESSURE: 75 MMHG | HEIGHT: 69 IN | SYSTOLIC BLOOD PRESSURE: 125 MMHG | WEIGHT: 201 LBS

## 2024-01-03 DIAGNOSIS — Z86.39 PERSONAL HISTORY OF OTHER ENDOCRINE, NUTRITIONAL AND METABOLIC DISEASE: ICD-10-CM

## 2024-01-03 DIAGNOSIS — E21.3 HYPERPARATHYROIDISM, UNSPECIFIED: ICD-10-CM

## 2024-01-03 PROCEDURE — 99213 OFFICE O/P EST LOW 20 MIN: CPT

## 2024-01-03 NOTE — PHYSICAL EXAM
[No Acute Distress] : no acute distress [Normal Sclera/Conjunctiva] : normal sclera/conjunctiva [No Proptosis] : no proptosis [Normal Outer Ear/Nose] : the ears and nose were normal in appearance [Thyroid Not Enlarged] : the thyroid was not enlarged [No Thyroid Nodules] : no palpable thyroid nodules [Clear to Auscultation] : lungs were clear to auscultation bilaterally [Normal Rate] : heart rate was normal [No Edema] : no peripheral edema [Soft] : abdomen soft [Spine Straight] : spine straight [No Stigmata of Cushings Syndrome] : no stigmata of Cushings Syndrome [Normal Gait] : normal gait [Normal Strength/Tone] : muscle strength and tone were normal [No Rash] : no rash [Normal Reflexes] : deep tendon reflexes were 2+ and symmetric [Oriented x3] : oriented to person, place, and time

## 2024-01-05 PROBLEM — Z86.39 HISTORY OF PRIMARY HYPERPARATHYROIDISM: Status: RESOLVED | Noted: 2017-12-26 | Resolved: 2024-01-05

## 2024-01-05 NOTE — END OF VISIT
[FreeTextEntry3] :  All medical record entries made by the Scribe were at my, Dr. Dorian Wills, direction and personally dictated by me on 01/03/2024. I have reviewed the chart and agree that the record accurately reflects my personal performance of the history, physical exam, assessment and plan. I have also personally directed, reviewed and agreed with the chart.  [Time Spent: ___ minutes] : I have spent [unfilled] minutes of time on the encounter.

## 2024-01-05 NOTE — HISTORY OF PRESENT ILLNESS
[FreeTextEntry1] : 49 y/o M pt with Hx of mild hypercalcemia (dx at [facility]), hyperparathyroidism, and parathyroid adenoma. Other PMHx: VIt D deficiency, reflux laryngitis, hypercholesterolemia, OCD, sleep apnea (on C-PAP, as per pt), pericarditis 2021 No PMHx of kidney stones, bone fractures, weight loss, malabsorption, bone pain, endocrinopathies No FHx of calcium disorders including FHH.  01/03/2024   Pt is here to establish endocrine care for his hyperparathyroidism.   CC: " I'm okay"  Pt reports that he underwent surgery 04/2023, where a parathyroid and a lymph node (mobile, benign; 3 failed attempts to biopsy) was removed. The parathyroid revealed a benign adenoma and the lymph node was also benign. Pt c/o of mild headaches and a subsequent negative MRI. He states that he follows up with his psychiatrist once a week.  Review In Labs:  4/14/23, pathology report: Left inferior parathyroid adenoma, excision: -   Enlarged hypercellular parathyroid, 1.3 cm (see note). [Medications verified as per pt on 01/03/2024]  Current Medications: Seroquil 25, Qualaquin, Anafranil, Pepcid, Lipitor, Fenofibrate, vit D 4000u qd, B12 1000 u twice per week

## 2024-01-05 NOTE — DATA REVIEWED
[FreeTextEntry1] : Reviewed labs: - 12/2023 calcium 10, s. creat 1.45 - 11/21/23 iPTH 15 pg/mL, calcium 10.4, calcium ionized 5.1.

## 2024-03-06 ENCOUNTER — APPOINTMENT (OUTPATIENT)
Dept: OTOLARYNGOLOGY | Facility: CLINIC | Age: 49
End: 2024-03-06
Payer: COMMERCIAL

## 2024-03-06 VITALS
BODY MASS INDEX: 29.78 KG/M2 | HEART RATE: 82 BPM | RESPIRATION RATE: 18 BRPM | WEIGHT: 196.5 LBS | OXYGEN SATURATION: 98 % | DIASTOLIC BLOOD PRESSURE: 70 MMHG | SYSTOLIC BLOOD PRESSURE: 116 MMHG | HEIGHT: 68 IN | TEMPERATURE: 97.9 F

## 2024-03-06 DIAGNOSIS — E06.3 AUTOIMMUNE THYROIDITIS: ICD-10-CM

## 2024-03-06 DIAGNOSIS — J04.0 ACUTE LARYNGITIS: ICD-10-CM

## 2024-03-06 DIAGNOSIS — Z98.890 OTHER SPECIFIED POSTPROCEDURAL STATES: ICD-10-CM

## 2024-03-06 DIAGNOSIS — D44.0 NEOPLASM OF UNCERTAIN BEHAVIOR OF THYROID GLAND: ICD-10-CM

## 2024-03-06 DIAGNOSIS — Z90.89 OTHER SPECIFIED POSTPROCEDURAL STATES: ICD-10-CM

## 2024-03-06 DIAGNOSIS — K21.9 ACUTE LARYNGITIS: ICD-10-CM

## 2024-03-06 DIAGNOSIS — T14.8XXA OTHER INJURY OF UNSPECIFIED BODY REGION, INITIAL ENCOUNTER: ICD-10-CM

## 2024-03-06 DIAGNOSIS — R40.0 SOMNOLENCE: ICD-10-CM

## 2024-03-06 DIAGNOSIS — D35.1 BENIGN NEOPLASM OF PARATHYROID GLAND: ICD-10-CM

## 2024-03-06 DIAGNOSIS — D49.0 NEOPLASM OF UNSPECIFIED BEHAVIOR OF DIGESTIVE SYSTEM: ICD-10-CM

## 2024-03-06 PROCEDURE — 31575 DIAGNOSTIC LARYNGOSCOPY: CPT

## 2024-03-06 PROCEDURE — 99214 OFFICE O/P EST MOD 30 MIN: CPT | Mod: 25

## 2024-03-06 RX ORDER — TRIAMCINOLONE ACETONIDE 1 MG/G
0.1 PASTE DENTAL 3 TIMES DAILY
Qty: 1 | Refills: 5 | Status: ACTIVE | COMMUNITY
Start: 2024-03-06 | End: 1900-01-01

## 2024-03-06 NOTE — REASON FOR VISIT
[FreeTextEntry2] : a f/u visit following parathyroidectomy and biopsy of a right level V spinal accessory cervical lymph node. [FreeTextEntry1] : Stephanie Amezcua MD, PCP, Anders Dela Cruz MD Cardiologist,  Ethel Domingo MD  Endocrinologist, Kyra Barajas MD, Donnell Amezcua MD Hematologist

## 2024-03-06 NOTE — HISTORY OF PRESENT ILLNESS
[de-identified] : Yifan is a generally healthy 43-year-old male  in the [employer] industry who was first noted 4-5 years ago to have mild hypercalcemia and had been monitored for this.  His calcium level most recently has been in the high normal range with a normal but nonsuppressed iPTH and a low vitamin D 25-OH total (25.9).  He has normal renal function.  He has no history of nephrolithiasis. He is euthyroid and a recent thyroid ultrasound revealed bilateral subcentimeter colloid cysts and two potential candidates for enlarged parathyroid glands that were described as extra-thyroidal adjacent to the posterior/inferior thyroid lobes measuring 9 mm (RT) and 10 mm (LT).  There were no abnormal cervical lymph nodes identified.  Both of his parents have or had hypothyroidism.  There is no family history of thyroid cancer or hypercalcemia.  He has no known radiation exposures. He denies bone fractures, joint or bone pain, memory loss, depression, fatigue, muscle weakness, abdominal pain, nausea, constipation, but does have polyuria and polydipsia. Yifan denies recent shortness of breath, voice changes, dysphagia, anterior neck pain, neck pressure or mass. There is no family history of thyroid cancer. He denies any known radiation exposures in his youth. -------------------------------------------------------------------------------------------------------------------------------------------------------------------------------------    [FreeTextEntry1] : Yifan returns for a f/u visit after an uneventful parathyroidectomy on 4/14/2023.  He had an approximately 1.1 cm left inferior parathyroid adenoma close to the lower pole of the left thyroid lobe.   A normal left upper parathyroid gland was identified, and a small micro biopsy was taken, confirmed as normocellular parathyroid tissue on frozen section.  The recurrent laryngeal nerve was functionally intact to nerve stimulation at the end of the procedure.   Intraoperative parathyroid hormone dropped from a preinduction baseline of 33.2 down to 11.4 pg/mL after multiple determinations approximately 40 minutes after excision of this gland with an initial rise likely related to manipulation of the adenoma.   A right level V lymph node was adherent to the spinal accessory nerve, but there was no evidence of malignancy.  He did have slight tingling in his toes post op that resolved.  He is eating a regular diet.  His voice has improved. His last serum calcium on November 21/2023 was 10.4 mg/dl and iPTH 15 pg/ml.  He has been taking vitamin D3 4K IU daily and last November levels were good at 42.8 ng/ml.   He had not had any calcium supplements but still taking vitamin D3 4000 IU daily. He feels well overall and trying to lose more weight.  After a shingles vaccine in September 2023, he developed pulsating headaches, and a brain MRI was normal. His intermittent tingling in the skin below the lower lip intermittently has since dissipated. He saw a nurse practitioner of a neurologist and there were no focal neurological deficits.  He was told he may have tension headaches.  In January he had an MRI of the brain with contrast and MRA without contrast and both were normal.  He has mild kidney disease that appears stable.  He has a stable non-obstructing kidney stone.  His most recent lab data from this month indicated a creatinine of 1.46 mg/DL with an EGFR of 58.  His vitamin D 25 OH total was in range at 44 NG/mL.  His calcium was normal at 10.0 MGs/DL with a normal intact parathyroid hormone at 27 PG/mL.  Thyroid function studies were normal. He complains of a new sore involving the mid palate that was felt to be benign by his dentist after Feuary 14th and is using Zilactin B topically with slight improvement.  He has Raynaud's disease and is under the care of a rheumatologist being monitored.  He also has obstructive sleep apnea and is successfully using a CPAP machine for this.  He denies fever, body aches, cough, cyanosis, chest burning, anosmia or recent known COVID exposures.  All family members at home are well.  He is vaccinated but not boosted.

## 2024-03-06 NOTE — END OF VISIT
[TextEntry] : I have spent 30 min of time for the encounter exclusive of any procedures performed during the visit.

## 2024-03-06 NOTE — PROCEDURE
[Image(s) Captured] : image(s) captured and filed [Unable to Cooperate with Mirror] : patient unable to cooperate with mirror [Gag Reflex] : gag reflex preventing mirror examination [Topical Lidocaine] : topical lidocaine [Flexible Endoscope] : examined with the flexible endoscope [Oxymetazoline HCl] : oxymetazoline HCl [Serial Number: ___] : Serial Number: [unfilled] [de-identified] : Verbal informed consent was obtained for fiber optic laryngoscopy.  Findings: The nasal septum is minimally deviated to the right with a spur. The mucosa is pale and dry from use of CPAP.  The nasal secretions are thick with some mild crusting intranasally.  There are no masses or polyps, and the nasal mucosa is normal. The choanae and posterior nasopharynx are normal without masses or drainage. The Eustachian tube orifices appear patent. The pharynx, including the posterior and lateral pharyngeal walls, the vallecula and base of tongue are normal without ulcerations, lesions or masses. The hypopharynx including the pyriform sinuses open well without pooling of secretions, mucosal lesions or masses. The supraglottic larynx including the epiglottis, petiole, arytenoids, glossoepiglottic, aryepiglottic and pharyngoepiglottic folds are normal without mucosal lesions, ulcerations or masses. The glottis reveals normal false vocal folds.  The true vocal folds slightly thickened but tense and of equal length, without paralysis, having symmetric mobility on adduction and abduction. There are no mucosal lesions, nodules, cysts, erythroplasia or leukoplakia. The posterior cricoid area has healthy pink mucosa in the interarytenoid area and esophageal inlet. There is persistent pachydermia involving the interarytenoid mucosa suggestive of posterior laryngitis from laryngopharyngeal acid reflux disease. The trachea is clear without narrowing in the immediate subglottic region, without deviation or lesions.  [de-identified] : sleep apnea, status post parathyroidectomy and new symptom of a raised nodule in the midline hard palate mucosa.involving both mental nerves.

## 2024-03-06 NOTE — REVIEW OF SYSTEMS
[TextEntry] : Signed ROS intake form from today's visit was reviewed and discussed with the patient.

## 2024-03-06 NOTE — CONSULT LETTER
[Dear  ___] : Dear  [unfilled], [Consult Letter:] : I had the pleasure of evaluating your patient, [unfilled]. [Consult Closing:] : Thank you very much for allowing me to participate in the care of this patient.  If you have any questions, please do not hesitate to contact me. [Please see my note below.] : Please see my note below. [Sincerely,] : Sincerely, [DrMalia  ___] : Dr. ESTRELLA [DrMalia ___] : Dr. ESTRELLA [FreeTextEntry3] : Viktor Engle M.D., FACS, [facility] Director [facility] Certified in Thyroid/Parathyroid/Neck Ultrasound, LESETR/ CHARLIE , Department of Otolaryngology [facility] [employer] [facility] at [facility]

## 2024-09-27 ENCOUNTER — RX RENEWAL (OUTPATIENT)
Age: 49
End: 2024-09-27

## 2025-01-02 ENCOUNTER — APPOINTMENT (OUTPATIENT)
Dept: UROLOGY | Facility: CLINIC | Age: 50
End: 2025-01-02
Payer: COMMERCIAL

## 2025-01-02 VITALS
SYSTOLIC BLOOD PRESSURE: 124 MMHG | HEIGHT: 68 IN | HEART RATE: 109 BPM | TEMPERATURE: 97 F | WEIGHT: 196 LBS | DIASTOLIC BLOOD PRESSURE: 77 MMHG | BODY MASS INDEX: 29.7 KG/M2

## 2025-01-02 DIAGNOSIS — R35.1 BENIGN PROSTATIC HYPERPLASIA WITH LOWER URINARY TRACT SYMPMS: ICD-10-CM

## 2025-01-02 DIAGNOSIS — N40.1 BENIGN PROSTATIC HYPERPLASIA WITH LOWER URINARY TRACT SYMPMS: ICD-10-CM

## 2025-01-02 PROCEDURE — 99213 OFFICE O/P EST LOW 20 MIN: CPT

## 2025-01-07 ENCOUNTER — NON-APPOINTMENT (OUTPATIENT)
Age: 50
End: 2025-01-07

## 2025-01-08 ENCOUNTER — APPOINTMENT (OUTPATIENT)
Dept: ENDOCRINOLOGY | Facility: CLINIC | Age: 50
End: 2025-01-08
Payer: COMMERCIAL

## 2025-01-08 VITALS
SYSTOLIC BLOOD PRESSURE: 118 MMHG | DIASTOLIC BLOOD PRESSURE: 71 MMHG | HEART RATE: 93 BPM | BODY MASS INDEX: 29.84 KG/M2 | WEIGHT: 196.25 LBS

## 2025-01-08 DIAGNOSIS — Z90.89 OTHER SPECIFIED POSTPROCEDURAL STATES: ICD-10-CM

## 2025-01-08 DIAGNOSIS — Z98.890 OTHER SPECIFIED POSTPROCEDURAL STATES: ICD-10-CM

## 2025-01-08 DIAGNOSIS — E21.3 HYPERPARATHYROIDISM, UNSPECIFIED: ICD-10-CM

## 2025-01-08 LAB
ANION GAP SERPL CALC-SCNC: 15 MMOL/L
APPEARANCE: CLEAR
BACTERIA UR CULT: NORMAL
BACTERIA: NEGATIVE /HPF
BILIRUBIN URINE: NEGATIVE
BLOOD URINE: NEGATIVE
BUN SERPL-MCNC: 15 MG/DL
CALCIUM SERPL-MCNC: 10.6 MG/DL
CAST: 0 /LPF
CHLORIDE SERPL-SCNC: 96 MMOL/L
CO2 SERPL-SCNC: 22 MMOL/L
COLOR: YELLOW
CREAT SERPL-MCNC: 1.46 MG/DL
EGFR: 58 ML/MIN/1.73M2
EPITHELIAL CELLS: 0 /HPF
GLUCOSE QUALITATIVE U: NEGATIVE MG/DL
GLUCOSE SERPL-MCNC: 94 MG/DL
KETONES URINE: NEGATIVE MG/DL
LEUKOCYTE ESTERASE URINE: NEGATIVE
MICROSCOPIC-UA: NORMAL
NITRITE URINE: NEGATIVE
PH URINE: 7
POTASSIUM SERPL-SCNC: 4.6 MMOL/L
PROTEIN URINE: NEGATIVE MG/DL
PSA FREE FLD-MCNC: 17 %
PSA FREE SERPL-MCNC: 0.07 NG/ML
PSA SERPL-MCNC: 0.39 NG/ML
RED BLOOD CELLS URINE: 5 /HPF
SODIUM SERPL-SCNC: 133 MMOL/L
SPECIFIC GRAVITY URINE: 1.01
UROBILINOGEN URINE: 0.2 MG/DL
WHITE BLOOD CELLS URINE: 1 /HPF

## 2025-01-08 PROCEDURE — 99214 OFFICE O/P EST MOD 30 MIN: CPT

## 2025-01-08 RX ORDER — FAMOTIDINE 40 MG/1
40 TABLET, FILM COATED ORAL
Refills: 0 | Status: ACTIVE | COMMUNITY

## 2025-01-08 RX ORDER — CLINDAMYCIN HYDROCHLORIDE 300 MG/1
CAPSULE ORAL
Refills: 0 | Status: ACTIVE | COMMUNITY

## 2025-01-23 ENCOUNTER — APPOINTMENT (OUTPATIENT)
Dept: UROLOGY | Facility: CLINIC | Age: 50
End: 2025-01-23
Payer: COMMERCIAL

## 2025-01-23 ENCOUNTER — NON-APPOINTMENT (OUTPATIENT)
Age: 50
End: 2025-01-23

## 2025-01-23 VITALS
SYSTOLIC BLOOD PRESSURE: 97 MMHG | TEMPERATURE: 98.2 F | WEIGHT: 196 LBS | DIASTOLIC BLOOD PRESSURE: 62 MMHG | HEIGHT: 68 IN | BODY MASS INDEX: 29.7 KG/M2 | HEART RATE: 116 BPM

## 2025-01-23 DIAGNOSIS — N40.1 BENIGN PROSTATIC HYPERPLASIA WITH LOWER URINARY TRACT SYMPMS: ICD-10-CM

## 2025-01-23 DIAGNOSIS — R35.1 BENIGN PROSTATIC HYPERPLASIA WITH LOWER URINARY TRACT SYMPMS: ICD-10-CM

## 2025-01-23 PROCEDURE — 76775 US EXAM ABDO BACK WALL LIM: CPT

## 2025-01-23 PROCEDURE — 52000 CYSTOURETHROSCOPY: CPT

## 2025-01-24 ENCOUNTER — NON-APPOINTMENT (OUTPATIENT)
Age: 50
End: 2025-01-24

## 2025-01-27 ENCOUNTER — NON-APPOINTMENT (OUTPATIENT)
Age: 50
End: 2025-01-27

## 2025-01-27 LAB
APPEARANCE: CLEAR
BACTERIA UR CULT: NORMAL
BACTERIA: NEGATIVE /HPF
BILIRUBIN URINE: NEGATIVE
BLOOD URINE: NEGATIVE
CALCIUM OXALATE CRYSTALS: PRESENT
CAST: 2 /LPF
COLOR: YELLOW
EPITHELIAL CELLS: 1 /HPF
GLUCOSE QUALITATIVE U: NEGATIVE MG/DL
KETONES URINE: NEGATIVE MG/DL
LEUKOCYTE ESTERASE URINE: ABNORMAL
MICROSCOPIC-UA: NORMAL
NITRITE URINE: NEGATIVE
PH URINE: 6
PROTEIN URINE: 30 MG/DL
RED BLOOD CELLS URINE: 8 /HPF
SPECIFIC GRAVITY URINE: 1.02
URINE CYTOLOGY: NORMAL
UROBILINOGEN URINE: 0.2 MG/DL
WHITE BLOOD CELLS URINE: 2 /HPF

## 2025-03-27 ENCOUNTER — APPOINTMENT (OUTPATIENT)
Dept: OTOLARYNGOLOGY | Facility: CLINIC | Age: 50
End: 2025-03-27
Payer: COMMERCIAL

## 2025-03-27 VITALS
WEIGHT: 195 LBS | TEMPERATURE: 98 F | OXYGEN SATURATION: 98 % | BODY MASS INDEX: 29.55 KG/M2 | SYSTOLIC BLOOD PRESSURE: 100 MMHG | HEART RATE: 82 BPM | DIASTOLIC BLOOD PRESSURE: 64 MMHG | HEIGHT: 68 IN

## 2025-03-27 DIAGNOSIS — D44.0 NEOPLASM OF UNCERTAIN BEHAVIOR OF THYROID GLAND: ICD-10-CM

## 2025-03-27 DIAGNOSIS — E21.3 HYPERPARATHYROIDISM, UNSPECIFIED: ICD-10-CM

## 2025-03-27 DIAGNOSIS — H73.899 OTHER SPECIFIED DISORDERS OF TYMPANIC MEMBRANE, UNSPECIFIED EAR: ICD-10-CM

## 2025-03-27 DIAGNOSIS — Z87.39 PERSONAL HISTORY OF OTHER DISEASES OF THE MUSCULOSKELETAL SYSTEM AND CONNECTIVE TISSUE: ICD-10-CM

## 2025-03-27 DIAGNOSIS — G47.33 OBSTRUCTIVE SLEEP APNEA (ADULT) (PEDIATRIC): ICD-10-CM

## 2025-03-27 DIAGNOSIS — Z98.890 OTHER SPECIFIED POSTPROCEDURAL STATES: ICD-10-CM

## 2025-03-27 DIAGNOSIS — Z90.89 OTHER SPECIFIED POSTPROCEDURAL STATES: ICD-10-CM

## 2025-03-27 DIAGNOSIS — E06.3 AUTOIMMUNE THYROIDITIS: ICD-10-CM

## 2025-03-27 PROCEDURE — 99215 OFFICE O/P EST HI 40 MIN: CPT | Mod: 25

## 2025-03-27 PROCEDURE — 31575 DIAGNOSTIC LARYNGOSCOPY: CPT

## 2025-03-27 PROCEDURE — 76536 US EXAM OF HEAD AND NECK: CPT

## 2025-04-09 ENCOUNTER — APPOINTMENT (OUTPATIENT)
Dept: OTOLARYNGOLOGY | Facility: CLINIC | Age: 50
End: 2025-04-09

## 2025-04-30 ENCOUNTER — APPOINTMENT (OUTPATIENT)
Dept: OTOLARYNGOLOGY | Facility: CLINIC | Age: 50
End: 2025-04-30
Payer: COMMERCIAL

## 2025-04-30 ENCOUNTER — APPOINTMENT (OUTPATIENT)
Dept: OTOLARYNGOLOGY | Facility: CLINIC | Age: 50
End: 2025-04-30

## 2025-04-30 DIAGNOSIS — H61.20 IMPACTED CERUMEN, UNSPECIFIED EAR: ICD-10-CM

## 2025-04-30 DIAGNOSIS — H93.299 OTHER ABNORMAL AUDITORY PERCEPTIONS, UNSPECIFIED EAR: ICD-10-CM

## 2025-04-30 DIAGNOSIS — H73.899 OTHER SPECIFIED DISORDERS OF TYMPANIC MEMBRANE, UNSPECIFIED EAR: ICD-10-CM

## 2025-04-30 PROCEDURE — 92557 COMPREHENSIVE HEARING TEST: CPT

## 2025-04-30 PROCEDURE — 92550 TYMPANOMETRY & REFLEX THRESH: CPT | Mod: 52

## 2025-04-30 PROCEDURE — G0268 REMOVAL OF IMPACTED WAX MD: CPT

## 2025-04-30 PROCEDURE — 99204 OFFICE O/P NEW MOD 45 MIN: CPT | Mod: 25

## 2025-05-01 PROBLEM — H93.299 ABNORMAL AUDITORY PERCEPTION: Status: ACTIVE | Noted: 2025-05-01

## (undated) DEVICE — SYR LUER LOK 3CC

## (undated) DEVICE — DRSG TEGADERM 2.5X3"

## (undated) DEVICE — SAFETY PIN 1.5" MED

## (undated) DEVICE — DRSG TEGADERM 4X4.75"

## (undated) DEVICE — SUT SILK 2-0 30" PSL

## (undated) DEVICE — SHEARS HARMONIC ACE 5MM X 23CM CURVED TIP

## (undated) DEVICE — SUT VICRYL 5-0 18" P-3 UNDYED

## (undated) DEVICE — DRSG MASTISOL

## (undated) DEVICE — WARMING BLANKET LOWER ADULT

## (undated) DEVICE — DRSG STERISTRIPS 0.25 X 4"

## (undated) DEVICE — DRSG CURITY GAUZE SPONGE 4 X 4" 12-PLY

## (undated) DEVICE — DRAPE TOWEL BLUE 17" X 24"

## (undated) DEVICE — SPONGE ENDO PEANUT 5MM

## (undated) DEVICE — BLADE SCALPEL SAFETY #15 WITH PLASTIC GREEN HANDLE

## (undated) DEVICE — DRAPE FLUID WARMER 44 X 66"

## (undated) DEVICE — PROBE PRASS SLIM MONOPOLAR STIMULATOR

## (undated) DEVICE — POSITIONER PATIENT SAFETY STRAP 3X60"

## (undated) DEVICE — SUT SILK 3-0 18" TIES

## (undated) DEVICE — GLV 7 PROTEXIS (WHITE)

## (undated) DEVICE — DRAPE MAGNETIC INSTRUMENT MEDIUM

## (undated) DEVICE — TUBING SUCTION 20FT

## (undated) DEVICE — NDL HYPO REGULAR BEVEL 25G X 1.5" (BLUE)

## (undated) DEVICE — VENODYNE/SCD SLEEVE CALF MEDIUM

## (undated) DEVICE — STAPLER SKIN PROXIMATE

## (undated) DEVICE — DRAPE EENT SPLIT SHEET LARGE

## (undated) DEVICE — SUT PROLENE 4-0 18" P-3

## (undated) DEVICE — SUT SILK 2-0 12-18"